# Patient Record
Sex: MALE | Race: WHITE | NOT HISPANIC OR LATINO | Employment: OTHER | URBAN - METROPOLITAN AREA
[De-identification: names, ages, dates, MRNs, and addresses within clinical notes are randomized per-mention and may not be internally consistent; named-entity substitution may affect disease eponyms.]

---

## 2017-04-18 ENCOUNTER — TRANSFERRED RECORDS (OUTPATIENT)
Dept: HEALTH INFORMATION MANAGEMENT | Facility: CLINIC | Age: 62
End: 2017-04-18

## 2017-04-23 DIAGNOSIS — E78.5 HYPERLIPIDEMIA LDL GOAL <130: ICD-10-CM

## 2017-04-23 NOTE — LETTER
[facility]  [address] [address]  [phone]      May 2, 2017      Pancho Costello  [address] [address] [address]        Dear Pancho,      Your atorvastatin (LIPITOR) 20 MG tablet was refilled for 30 days   You are due to be seen in clinic by your Provider prior to your next refill   Please contact the clinic to schedule a fasting office visit and allow enough time to schedule prior to your next refill need.  [phone]       Marta  for :   Dr. Yanira Corea, NPCNP   Dr. Suzie Howe

## 2017-04-25 RX ORDER — ATORVASTATIN CALCIUM 20 MG/1
TABLET, FILM COATED ORAL
Qty: 30 TABLET | Refills: 0 | Status: SHIPPED | OUTPATIENT
Start: 2017-04-25 | End: 2017-07-14

## 2017-04-25 NOTE — TELEPHONE ENCOUNTER
please call patient to schedule fasting office visit for medication        Refill approved through [facility] protocol.  Blanca Nick RN  [facility]  [phone]

## 2017-04-25 NOTE — TELEPHONE ENCOUNTER
Lipitor     Last Written Prescription Date: 5/3/16  Last Fill Quantity: 30, # refills: 11  Last Office Visit with [facility], [facility] or [facility] prescribing provider: 11/2/16       Lab Results   Component Value Date    CHOL 175 05/03/2016     Lab Results   Component Value Date    HDL 46 05/03/2016     Lab Results   Component Value Date     05/03/2016     Lab Results   Component Value Date    TRIG 105 05/03/2016     Lab Results   Component Value Date    CHOLHDLRATIO 4.2 07/13/2015     HARDEEP Asif LPN

## 2017-05-01 ENCOUNTER — EXTERNAL ORDER RESULTS (OUTPATIENT)
Dept: HEALTH INFORMATION MANAGEMENT | Facility: CLINIC | Age: 62
End: 2017-05-01

## 2017-05-01 DIAGNOSIS — E11.9 TYPE 2 DIABETES MELLITUS WITHOUT COMPLICATION, WITHOUT LONG-TERM CURRENT USE OF INSULIN (H): ICD-10-CM

## 2017-05-01 NOTE — TELEPHONE ENCOUNTER
Metformin         Last Written Prescription Date: 11/2/16  Last Fill Quantity: 180, # refills: 1  Last Office Visit with [facility], [facility] or [facility] prescribing provider:  11/2/16        BP Readings from Last 3 Encounters:   11/02/16 136/84   06/14/16 130/86   12/08/15 138/84     Lab Results   Component Value Date    MICROL 6 06/14/2016     Lab Results   Component Value Date    UMALCR 4.31 06/14/2016     Creatinine   Date Value Ref Range Status   11/02/2016 0.81 0.66 - 1.25 mg/dL Final   ]  GFR Estimate   Date Value Ref Range Status   11/02/2016 >90  Non  GFR Calc   >60 mL/min/1.7m2 Final   06/11/2015 >90  Non  GFR Calc   >60 mL/min/1.7m2 Final     GFR Estimate If Black   Date Value Ref Range Status   11/02/2016 >90   GFR Calc   >60 mL/min/1.7m2 Final   06/11/2015 >90   GFR Calc   >60 mL/min/1.7m2 Final     Lab Results   Component Value Date    CHOL 175 05/03/2016     Lab Results   Component Value Date    HDL 46 05/03/2016     Lab Results   Component Value Date     05/03/2016     Lab Results   Component Value Date    TRIG 105 05/03/2016     Lab Results   Component Value Date    CHOLHDLRATIO 4.2 07/13/2015     Lab Results   Component Value Date    AST 57 11/02/2016     Lab Results   Component Value Date     11/02/2016     Lab Results   Component Value Date    A1C 7.4 11/02/2016    A1C 6.8 06/14/2016    A1C 6.3 12/08/2015    A1C 8.3 07/13/2015    A1C 10.6 06/11/2015     Potassium   Date Value Ref Range Status   11/02/2016 4.4 3.4 - 5.3 mmol/L Final     Gina Cervantes CMA

## 2017-05-01 NOTE — LETTER
[facility]  [address] [place] [address]  [phone]      May 11, 2017      Pancho Costello  [address] [address] [address]        Dear Pancho,    Our records indicates that it is time for you to be seen for an office visit with Chris Zuluaga MD.    Please call our office at [phone] to schedule an appointment for Medication Check .   If you are no longer a [facility] patient; please let us know that as well.    Please disregard this notice if you have already made an appointment.      Sincerely,    [facility] Staff

## 2017-05-02 NOTE — PATIENT INSTRUCTIONS
metformin         Last Written Prescription Date: 11/2/16  Last Fill Quantity: 180, # refills: 1  Last Office Visit with [facility], [facility] or [facility] prescribing provider:  11/2/16        BP Readings from Last 3 Encounters:   11/02/16 136/84   06/14/16 130/86   12/08/15 138/84     Lab Results   Component Value Date    MICROL 6 06/14/2016     Lab Results   Component Value Date    UMALCR 4.31 06/14/2016     Creatinine   Date Value Ref Range Status   11/02/2016 0.81 0.66 - 1.25 mg/dL Final   ]  GFR Estimate   Date Value Ref Range Status   11/02/2016 >90  Non  GFR Calc   >60 mL/min/1.7m2 Final   06/11/2015 >90  Non  GFR Calc   >60 mL/min/1.7m2 Final     GFR Estimate If Black   Date Value Ref Range Status   11/02/2016 >90   GFR Calc   >60 mL/min/1.7m2 Final   06/11/2015 >90   GFR Calc   >60 mL/min/1.7m2 Final     Lab Results   Component Value Date    CHOL 175 05/03/2016     Lab Results   Component Value Date    HDL 46 05/03/2016     Lab Results   Component Value Date     05/03/2016     Lab Results   Component Value Date    TRIG 105 05/03/2016     Lab Results   Component Value Date    CHOLHDLRATIO 4.2 07/13/2015     Lab Results   Component Value Date    AST 57 11/02/2016     Lab Results   Component Value Date     11/02/2016     Lab Results   Component Value Date    A1C 7.4 11/02/2016    A1C 6.8 06/14/2016    A1C 6.3 12/08/2015    A1C 8.3 07/13/2015    A1C 10.6 06/11/2015     Potassium   Date Value Ref Range Status   11/02/2016 4.4 3.4 - 5.3 mmol/L Final     Routing refill request to provider for review/approval because:  Labs not current:  Due every 6 months for protocol    Blanca Nick RN  [facility]  [phone]

## 2017-05-02 NOTE — TELEPHONE ENCOUNTER
Routing to team to inform and assist in scheduling.   Ria Blackmon RN   [facility] - Triage

## 2017-07-14 ENCOUNTER — OFFICE VISIT (OUTPATIENT)
Dept: FAMILY MEDICINE | Facility: CLINIC | Age: 62
End: 2017-07-14
Payer: COMMERCIAL

## 2017-07-14 VITALS
TEMPERATURE: 98.6 F | HEART RATE: 78 BPM | HEIGHT: 72 IN | OXYGEN SATURATION: 95 % | BODY MASS INDEX: 35.35 KG/M2 | SYSTOLIC BLOOD PRESSURE: 138 MMHG | DIASTOLIC BLOOD PRESSURE: 82 MMHG | WEIGHT: 261 LBS

## 2017-07-14 DIAGNOSIS — E11.9 TYPE 2 DIABETES MELLITUS WITHOUT COMPLICATION, WITHOUT LONG-TERM CURRENT USE OF INSULIN (H): ICD-10-CM

## 2017-07-14 DIAGNOSIS — I10 HYPERTENSION GOAL BP (BLOOD PRESSURE) < 140/80: ICD-10-CM

## 2017-07-14 DIAGNOSIS — E66.01 MORBID OBESITY, UNSPECIFIED OBESITY TYPE (H): ICD-10-CM

## 2017-07-14 DIAGNOSIS — E78.5 HYPERLIPIDEMIA LDL GOAL <100: Primary | ICD-10-CM

## 2017-07-14 LAB — HBA1C MFR BLD: 6.4 % (ref 4.3–6)

## 2017-07-14 PROCEDURE — 36415 COLL VENOUS BLD VENIPUNCTURE: CPT | Performed by: FAMILY MEDICINE

## 2017-07-14 PROCEDURE — 80053 COMPREHEN METABOLIC PANEL: CPT | Performed by: FAMILY MEDICINE

## 2017-07-14 PROCEDURE — 99214 OFFICE O/P EST MOD 30 MIN: CPT | Performed by: FAMILY MEDICINE

## 2017-07-14 PROCEDURE — 83036 HEMOGLOBIN GLYCOSYLATED A1C: CPT | Performed by: FAMILY MEDICINE

## 2017-07-14 PROCEDURE — 82043 UR ALBUMIN QUANTITATIVE: CPT | Performed by: FAMILY MEDICINE

## 2017-07-14 PROCEDURE — G0103 PSA SCREENING: HCPCS | Performed by: FAMILY MEDICINE

## 2017-07-14 PROCEDURE — 80061 LIPID PANEL: CPT | Performed by: FAMILY MEDICINE

## 2017-07-14 RX ORDER — LISINOPRIL 20 MG/1
20 TABLET ORAL DAILY
Qty: 90 TABLET | Refills: 3 | Status: CANCELLED | OUTPATIENT
Start: 2017-07-14

## 2017-07-14 RX ORDER — ATORVASTATIN CALCIUM 20 MG/1
TABLET, FILM COATED ORAL
Qty: 90 TABLET | Refills: 1 | Status: SHIPPED | OUTPATIENT
Start: 2017-07-14 | End: 2018-01-08

## 2017-07-14 RX ORDER — ATORVASTATIN CALCIUM 20 MG/1
TABLET, FILM COATED ORAL
Qty: 30 TABLET | Refills: 0 | Status: CANCELLED | OUTPATIENT
Start: 2017-07-14

## 2017-07-14 RX ORDER — LANCETS
EACH MISCELLANEOUS
Qty: 100 EACH | Refills: 1 | Status: SHIPPED | OUTPATIENT
Start: 2017-07-14 | End: 2019-02-13

## 2017-07-14 NOTE — PROGRESS NOTES
SUBJECTIVE:                                                    Pancho Costello is a 61 year old male who presents to clinic today for the following health issues:      Diabetes Follow-up  His dose was increase to 1000 mg metformin BID.  He denies nay side effects. He continue to take his lisinopril and cholesterol medication. Denies any chest pain, shortness of breath.      Patient is checking blood sugars:  2 times a week 120. 110. 130     Diabetic concerns: None     Symptoms of hypoglycemia (low blood sugar): blurred vision     Paresthesias (numbness or burning in feet) or sores: Yes little      Date of last diabetic eye exam: 2 months ago       Amount of exercise or physical activity: 4 days a week     Problems taking medications regularly: No    Medication side effects: stomach ache     Diet: low fat/cholesterol          Problem list and histories reviewed & adjusted, as indicated.  Additional history: as documented    Patient Active Problem List   Diagnosis     Hypertension goal BP (blood pressure) < 140/80     Hyperlipidemia LDL goal <100     Advanced directives, counseling/discussion     Type 2 diabetes mellitus without complication, without long-term current use of insulin (H)     Morbid obesity, unspecified obesity type (H)     Past Surgical History:   Procedure Laterality Date     LIGATN/STRIP LONG & SHORT SAPHEN      2000       Social History   Substance Use Topics     Smoking status: Never Smoker     Smokeless tobacco: Never Used     Alcohol use 0.0 oz/week     0 Standard drinks or equivalent per week     Family History   Problem Relation Age of Onset     Heart Failure Mother      Breast Cancer Mother      DIABETES Mother          Current Outpatient Prescriptions   Medication Sig Dispense Refill     blood glucose monitoring (ACCU-CHEK SMARTVIEW) test strip Testing 1-2 times per week 100 each 1     blood glucose monitoring (ACCU-CHEK FASTCLIX) lancets Test 1 times a day 100 each 1     metFORMIN  (GLUCOPHAGE) 1000 MG tablet TAKE 1 TABLET(1000 MG) BY MOUTH TWICE DAILY WITH MEALS 60 tablet 0     atorvastatin (LIPITOR) 20 MG tablet TAKE 1 TABLET(20 MG) BY MOUTH DAILY 30 tablet 0     lisinopril (PRINIVIL,ZESTRIL) 20 MG tablet Take 1 tablet (20 mg) by mouth daily 90 tablet 3     aspirin 81 MG tablet Take 1 tablet (81 mg) by mouth daily 30 tablet 11       Reviewed and updated as needed this visit by clinical staff  Tobacco  Allergies  Meds       Reviewed and updated as needed this visit by Provider         ROS:  C: NEGATIVE for fever, chills, change in weight  E/M: NEGATIVE for ear, mouth and throat problems  R: NEGATIVE for significant cough or SOB  CV: NEGATIVE for chest pain, palpitations or peripheral edema    OBJECTIVE:                                                    /82  Pulse 78  Temp 98.6  F (37  C) (Oral)  Ht 6' (1.829 m)  Wt 261 lb (118.4 kg)  SpO2 95%  BMI 35.4 kg/m2  Body mass index is 35.4 kg/(m^2).   GENERAL: healthy, alert, well nourished, well hydrated, no distress  HENT: ear canals- normal; TMs- normal; Nose- normal; Mouth- no ulcers, no lesions  NECK: no tenderness, no adenopathy, no asymmetry, no masses, no stiffness; thyroid- normal to palpation  RESP: lungs clear to auscultation - no rales, no rhonchi, no wheezes  CV: regular rates and rhythm, normal S1 S2, no S3 or S4 and no murmur, no click or rub -  ABDOMEN: soft, no tenderness, no  hepatosplenomegaly, no masses, normal bowel sounds       ASSESSMENT/PLAN:                                                        ICD-10-CM    1. Hyperlipidemia LDL goal <100 E78.5 Comprehensive metabolic panel     Lipid Profile (Chol, Trig, HDL, LDL calc)   2. Type 2 diabetes mellitus without complication, without long-term current use of insulin (H) E11.9 HEMOGLOBIN A1C     blood glucose monitoring (ACCU-CHEK SMARTVIEW) test strip     blood glucose monitoring (ACCU-CHEK FASTCLIX) lancets     Comprehensive metabolic panel     Lipid Profile  (Chol, Trig, HDL, LDL calc)     Albumin Random Urine Quantitative   3. Hypertension goal BP (blood pressure) < 140/80 I10 Comprehensive metabolic panel     PSA, screen     Albumin Random Urine Quantitative   4. Morbid obesity, unspecified obesity type (H) E66.01        Patient Blood pressure is better recked was better, AIC is improved now with number now in normal range, he is working on the life style.  Labs ordered will follow up on them.  No new medication change, will follow up on the kidney function. He will follow up in dec-2017 will reevaluate the need of adjusting the latham, no new or change in medications.    Chris Zuluaga MD  [facility]

## 2017-07-14 NOTE — Clinical Note
Please abstract the following data from this visit with this patient into the appropriate field in Epic:  Eye exam with ophthalmology on this date: may 2017

## 2017-07-14 NOTE — MR AVS SNAPSHOT
After Visit Summary   7/14/2017    Pancho Costello    MRN: [id number]           Patient Information     Date Of Birth          1955        Visit Information        Provider Department      7/14/2017 8:40 AM [facility]        Today's Diagnoses     Hyperlipidemia LDL goal <100    -  1    Type 2 diabetes mellitus without complication, without long-term current use of insulin (H)        Hypertension goal BP (blood pressure) < 140/80        Morbid obesity, unspecified obesity type (H)           Follow-ups after your visit        Who to contact     If you have questions or need follow up information about today's clinic visit or your schedule please contact [facility] directly at [phone].  Normal or non-critical lab and imaging results will be communicated to you by Sparks[employer], letter or phone within 4 business days after the clinic has received the results. If you do not hear from us within 7 days, please contact the clinic through NPSt or phone. If you have a critical or abnormal lab result, we will notify you by phone as soon as possible.  Submit refill requests through [employer] or call your pharmacy and they will forward the refill request to us. Please allow 3 business days for your refill to be completed.          Additional Information About Your Visit        MyChart Information     [employer] gives you secure access to your electronic health record. If you see a primary care provider, you can also send messages to your care team and make appointments. If you have questions, please call your primary care clinic.  If you do not have a primary care provider, please call [phone] and they will assist you.        Care EveryWhere ID     This is your Care EveryWhere ID. This could be used by other organizations to access your [facility] medical records  [id number]        Your Vitals Were     Pulse Temperature Height Pulse Oximetry BMI (Body Mass  Index)       78 98.6  F (37  C) (Oral) 6' (1.829 m) 95% 35.4 kg/m2        Blood Pressure from Last 3 Encounters:   07/14/17 138/82   11/02/16 136/84   06/14/16 130/86    Weight from Last 3 Encounters:   07/14/17 261 lb (118.4 kg)   11/07/16 264 lb (119.7 kg)   11/02/16 258 lb 6.4 oz (117.2 kg)              We Performed the Following     Albumin Random Urine Quantitative     Comprehensive metabolic panel     HEMOGLOBIN A1C     Lipid Profile (Chol, Trig, HDL, LDL calc)     PSA, screen          Today's Medication Changes          These changes are accurate as of: 7/14/17  9:34 AM.  If you have any questions, ask your nurse or doctor.               These medicines have changed or have updated prescriptions.        Dose/Directions    atorvastatin 20 MG tablet   Commonly known as:  LIPITOR   This may have changed:  See the new instructions.   Used for:  Hyperlipidemia LDL goal <100   Changed by:  Chris Zuluaga MD        TAKE 1 TABLET(20 MG) BY MOUTH DAILY   Quantity:  90 tablet   Refills:  1       blood glucose monitoring lancets   This may have changed:  additional instructions   Used for:  Type 2 diabetes mellitus without complication, without long-term current use of insulin (H)   Changed by:  Chris Zuluaga MD        Test 1 times a day   Quantity:  100 each   Refills:  1       metFORMIN 1000 MG tablet   Commonly known as:  GLUCOPHAGE   This may have changed:  See the new instructions.   Used for:  Type 2 diabetes mellitus without complication, without long-term current use of insulin (H)   Changed by:  Chris Zuluaga MD        TAKE 1 TABLET(1000 MG) BY MOUTH TWICE DAILY WITH MEALS   Quantity:  180 tablet   Refills:  1            Where to get your medicines      These medications were sent to [id number] - [place], [place] - [address] [place] AT [facility] [address]  [address] [address] [place], [place] [address]     Phone:  [phone]     atorvastatin 20 MG tablet    blood glucose  monitoring lancets    blood glucose monitoring test strip    metFORMIN 1000 MG tablet                Primary Care Provider Office Phone # Fax #    Chris Zuluaga [phone]       [facility] [place] [address]  [place] [address]        Equal Access to Services     PURVI ALVA : Hadii aad ku hadasho Soomaali, waaxda luqadaha, qaybta kaalmada adeegyada, waxay idiin hayaan adeeg kharash la'aan ah. So [facility] [phone].    ATENCIÓN: Si habla español, tiene a ruvalcaba disposición servicios gratuitos de asistencia lingüística. Llame al [phone].    We comply with applicable federal civil rights laws and [place] laws. We do not discriminate on the basis of race, color, national origin, age, disability sex, sexual orientation or gender identity.            Thank you!     Thank you for choosing [facility] [place]  for your care. Our goal is always to provide you with excellent care. Hearing back from our patients is one way we can continue to improve our services. Please take a few minutes to complete the written survey that you may receive in the mail after your visit with us. Thank you!             Your Updated Medication List - Protect others around you: Learn how to safely use, store and throw away your medicines at www.disposemymeds.org.          This list is accurate as of: 7/14/17  9:34 AM.  Always use your most recent med list.                   Brand Name Dispense Instructions for use Diagnosis    aspirin 81 MG tablet     30 tablet    Take 1 tablet (81 mg) by mouth daily    Type 2 diabetes, HbA1C goal < 8% (H)       atorvastatin 20 MG tablet    LIPITOR    90 tablet    TAKE 1 TABLET(20 MG) BY MOUTH DAILY    Hyperlipidemia LDL goal <100       blood glucose monitoring lancets     100 each    Test 1 times a day    Type 2 diabetes mellitus without complication, without long-term current use of insulin (H)       blood glucose monitoring test strip    ACCU-CHEK SMARTVIEW     100 each    Testing 1-2 times per week    Type 2 diabetes mellitus without complication, without long-term current use of insulin (H)       lisinopril 20 MG tablet    PRINIVIL/ZESTRIL    90 tablet    Take 1 tablet (20 mg) by mouth daily    Hypertension goal BP (blood pressure) < 140/80       metFORMIN 1000 MG tablet    GLUCOPHAGE    180 tablet    TAKE 1 TABLET(1000 MG) BY MOUTH TWICE DAILY WITH MEALS    Type 2 diabetes mellitus without complication, without long-term current use of insulin (H)

## 2017-07-15 LAB
ALBUMIN SERPL-MCNC: 4.1 G/DL (ref 3.4–5)
ALP SERPL-CCNC: 74 U/L (ref 40–150)
ALT SERPL W P-5'-P-CCNC: 95 U/L (ref 0–70)
ANION GAP SERPL CALCULATED.3IONS-SCNC: 8 MMOL/L (ref 3–14)
AST SERPL W P-5'-P-CCNC: 48 U/L (ref 0–45)
BILIRUB SERPL-MCNC: 0.8 MG/DL (ref 0.2–1.3)
BUN SERPL-MCNC: 15 MG/DL (ref 7–30)
CALCIUM SERPL-MCNC: 9.7 MG/DL (ref 8.5–10.1)
CHLORIDE SERPL-SCNC: 101 MMOL/L (ref 94–109)
CHOLEST SERPL-MCNC: 217 MG/DL
CO2 SERPL-SCNC: 28 MMOL/L (ref 20–32)
CREAT SERPL-MCNC: 0.78 MG/DL (ref 0.66–1.25)
CREAT UR-MCNC: 213 MG/DL
GFR SERPL CREATININE-BSD FRML MDRD: ABNORMAL ML/MIN/1.7M2
GLUCOSE SERPL-MCNC: 152 MG/DL (ref 70–99)
HDLC SERPL-MCNC: 49 MG/DL
LDLC SERPL CALC-MCNC: 148 MG/DL
MICROALBUMIN UR-MCNC: 16 MG/L
MICROALBUMIN/CREAT UR: 7.7 MG/G CR (ref 0–17)
NONHDLC SERPL-MCNC: 168 MG/DL
POTASSIUM SERPL-SCNC: 4.2 MMOL/L (ref 3.4–5.3)
PROT SERPL-MCNC: 8 G/DL (ref 6.8–8.8)
PSA SERPL-ACNC: 0.25 UG/L (ref 0–4)
SODIUM SERPL-SCNC: 137 MMOL/L (ref 133–144)
TRIGL SERPL-MCNC: 100 MG/DL

## 2017-10-24 DIAGNOSIS — I10 HYPERTENSION GOAL BP (BLOOD PRESSURE) < 140/80: ICD-10-CM

## 2017-10-24 RX ORDER — LISINOPRIL 20 MG/1
TABLET ORAL
Qty: 90 TABLET | Refills: 2 | Status: SHIPPED | OUTPATIENT
Start: 2017-10-24 | End: 2018-06-13

## 2017-10-24 NOTE — TELEPHONE ENCOUNTER
Prescription approved per [facility], [facility] or [facility] refill protocol.  Ewa Roth RN - Triage  [facility]

## 2018-05-17 DIAGNOSIS — E11.9 TYPE 2 DIABETES MELLITUS WITHOUT COMPLICATION, WITHOUT LONG-TERM CURRENT USE OF INSULIN (H): ICD-10-CM

## 2018-05-17 DIAGNOSIS — E78.5 HYPERLIPIDEMIA LDL GOAL <100: ICD-10-CM

## 2018-05-17 RX ORDER — ATORVASTATIN CALCIUM 20 MG/1
TABLET, FILM COATED ORAL
Qty: 30 TABLET | Refills: 0 | OUTPATIENT
Start: 2018-05-17

## 2018-05-17 NOTE — TELEPHONE ENCOUNTER
Requested Prescriptions   Pending Prescriptions Disp Refills     metFORMIN (GLUCOPHAGE) 1000 MG tablet [Pharmacy Med Name: METFORMIN 1000MG TABLETS] 20 tablet 0    Last Written Prescription Date:  04/12/2018  Last Fill Quantity: 20 tablet,  # refills: 0   Last office visit: 7/14/2017 with prescribing provider:  Chris Zuluaga   Future Office Visit:   Next 5 appointments (look out 90 days)     May 23, 2018  9:00 AM CDT   MyChart Physical Adult with Chris Zuluaga MD   [facility] [place] [address]   [phone]                  Sig: TAKE 1 TABLET BY MOUTH TWICE DAILY WITH MEALS    Biguanide Agents Failed    5/17/2018 10:28 AM       Failed - Blood pressure less than 140/90 in past 6 months    BP Readings from Last 3 Encounters:   07/14/17 138/82   11/02/16 136/84   06/14/16 130/86                Failed - Patient has documented A1c within the specified period of time.    If HgbA1C is 8 or greater, it needs to be on file within the past 3 months.  If less than 8, must be on file within the past 6 months.     Recent Labs   Lab Test  07/14/17   0903   A1C  6.4*            Passed - Patient has documented LDL within the past 12 mos.    Recent Labs   Lab Test  07/14/17   0903   LDL  148*            Passed - Patient has had a Microalbumin in the past 12 mos.    Recent Labs   Lab Test  07/14/17   0908   MICROL  16   UMALCR  7.70            Passed - Patient is age 10 or older       Passed - Patient's CR is NOT>1.4 OR Patient's EGFR is NOT<45 within past 12 mos.    Recent Labs   Lab Test  07/14/17   0903   GFRESTIMATED  >90  Non  GFR Calc     GFRESTBLACK  >90   GFR Calc         Recent Labs   Lab Test  07/14/17   0903   CR  0.78            Passed - Patient does NOT have a diagnosis of CHF.       Passed - Recent (6 mo) or future (30 days) visit within the authorizing provider's specialty    Patient had office visit in  "the last 6 months or has a visit in the next 30 days with authorizing provider or within the authorizing provider's specialty.  See \"Patient Info\" tab in inbasket, or \"Choose Columns\" in Meds & Orders section of the refill encounter.            atorvastatin (LIPITOR) 20 MG tablet [Pharmacy Med Name: ATORVASTATIN 20MG TABLETS] 30 tablet 0    Last Written Prescription Date:  03/14/2018  Last Fill Quantity: 30 tablet,  # refills: 1   Last office visit: 7/14/2017 with prescribing provider:  Chris Zuluaga   Future Office Visit:   Next 5 appointments (look out 90 days)     May 23, 2018  9:00 AM CDT   MyChart Physical Adult with Chris Zuluaga MD   [facility] [place] [address]   [phone]                  Sig: TAKE ONE TABLET BY MOUTH DAILY    Statins Protocol Passed    5/17/2018 10:28 AM       Passed - LDL on file in past 12 months    Recent Labs   Lab Test  07/14/17   0903   LDL  148*            Passed - No abnormal creatine kinase in past 12 months    No lab results found.            Passed - Recent (12 mo) or future (30 days) visit within the authorizing provider's specialty    Patient had office visit in the last 12 months or has a visit in the next 30 days with authorizing provider or within the authorizing provider's specialty.  See \"Patient Info\" tab in inbasket, or \"Choose Columns\" in Meds & Orders section of the refill encounter.           Passed - Patient is age 18 or older          "

## 2018-05-17 NOTE — TELEPHONE ENCOUNTER
Patient has been given multiple luna refills. See previous notes. No further refills until seen. Routing to team to inform and assist in scheduling.   Ria Blackmon RN   [facility] - Triage

## 2018-05-21 RX ORDER — ATORVASTATIN CALCIUM 20 MG/1
20 TABLET, FILM COATED ORAL DAILY
Qty: 30 TABLET | Refills: 1 | Status: SHIPPED | OUTPATIENT
Start: 2018-05-21 | End: 2018-06-14

## 2018-05-21 NOTE — TELEPHONE ENCOUNTER
RN's have give a 1 time luna refill per protocol routing to providers for further refills until scheduled appointment.  Ewa Roth RN - Triage  [facility]      Next 5 appointments (look out 90 days)     Jun 13, 2018  8:20 AM CDT   Office Visit with Chris Zuluaga MD   [facility]    [address] [place] [id number] [phone]

## 2018-06-13 ENCOUNTER — OFFICE VISIT (OUTPATIENT)
Dept: FAMILY MEDICINE | Facility: CLINIC | Age: 63
End: 2018-06-13
Payer: COMMERCIAL

## 2018-06-13 VITALS
HEART RATE: 68 BPM | DIASTOLIC BLOOD PRESSURE: 80 MMHG | SYSTOLIC BLOOD PRESSURE: 124 MMHG | TEMPERATURE: 97.9 F | BODY MASS INDEX: 35.26 KG/M2 | WEIGHT: 260 LBS

## 2018-06-13 DIAGNOSIS — E78.5 HYPERLIPIDEMIA LDL GOAL <100: ICD-10-CM

## 2018-06-13 DIAGNOSIS — E66.01 MORBID OBESITY, UNSPECIFIED OBESITY TYPE (H): ICD-10-CM

## 2018-06-13 DIAGNOSIS — I10 HYPERTENSION GOAL BP (BLOOD PRESSURE) < 140/80: ICD-10-CM

## 2018-06-13 DIAGNOSIS — E11.9 TYPE 2 DIABETES MELLITUS WITHOUT COMPLICATION, WITHOUT LONG-TERM CURRENT USE OF INSULIN (H): Primary | ICD-10-CM

## 2018-06-13 DIAGNOSIS — Z11.4 SCREENING FOR HIV (HUMAN IMMUNODEFICIENCY VIRUS): ICD-10-CM

## 2018-06-13 DIAGNOSIS — Z13.89 SCREENING FOR DIABETIC PERIPHERAL NEUROPATHY: ICD-10-CM

## 2018-06-13 LAB
HBA1C MFR BLD: 7.2 % (ref 0–5.6)
HIV 1+2 AB+HIV1 P24 AG SERPL QL IA: NONREACTIVE

## 2018-06-13 PROCEDURE — 82043 UR ALBUMIN QUANTITATIVE: CPT | Performed by: FAMILY MEDICINE

## 2018-06-13 PROCEDURE — 36415 COLL VENOUS BLD VENIPUNCTURE: CPT | Performed by: FAMILY MEDICINE

## 2018-06-13 PROCEDURE — 99214 OFFICE O/P EST MOD 30 MIN: CPT | Performed by: FAMILY MEDICINE

## 2018-06-13 PROCEDURE — 83036 HEMOGLOBIN GLYCOSYLATED A1C: CPT | Performed by: FAMILY MEDICINE

## 2018-06-13 PROCEDURE — 80061 LIPID PANEL: CPT | Performed by: FAMILY MEDICINE

## 2018-06-13 PROCEDURE — 87389 HIV-1 AG W/HIV-1&-2 AB AG IA: CPT | Performed by: FAMILY MEDICINE

## 2018-06-13 PROCEDURE — 84443 ASSAY THYROID STIM HORMONE: CPT | Performed by: FAMILY MEDICINE

## 2018-06-13 PROCEDURE — 80053 COMPREHEN METABOLIC PANEL: CPT | Performed by: FAMILY MEDICINE

## 2018-06-13 PROCEDURE — 99207 C FOOT EXAM  NO CHARGE: CPT | Performed by: FAMILY MEDICINE

## 2018-06-13 RX ORDER — LISINOPRIL 20 MG/1
TABLET ORAL
Qty: 90 TABLET | Refills: 3 | Status: SHIPPED | OUTPATIENT
Start: 2018-06-13 | End: 2019-07-02

## 2018-06-13 NOTE — MR AVS SNAPSHOT
After Visit Summary   6/13/2018    Pancho Costello    MRN: [id number]           Patient Information     Date Of Birth          1955        Visit Information        Provider Department      6/13/2018 8:20 AM Chris Zuluaga MD [facility] [place]        Today's Diagnoses     Type 2 diabetes mellitus without complication, without long-term current use of insulin (H)    -  1    Screening for HIV (human immunodeficiency virus)        Morbid obesity, unspecified obesity type (H)        Screening for diabetic peripheral neuropathy        Hyperlipidemia LDL goal <100        Hypertension goal BP (blood pressure) < 140/80           Follow-ups after your visit        Follow-up notes from your care team     Return in about 3 months (around 9/13/2018) for Physical Exam.      Your next 10 appointments already scheduled     Sep 11, 2018  8:20 AM CDT   Office Visit with Chris Zuluaga MD   [facility] [place] ([facility] [place])    [address] [place] [address]   [phone]           Bring a current list of meds and any records pertaining to this visit. For Physicals, please bring immunization records and any forms needing to be filled out. Please arrive 10 minutes early to complete paperwork.              Who to contact     If you have questions or need follow up information about today's clinic visit or your schedule please contact [facility] [place] directly at [phone].  Normal or non-critical lab and imaging results will be communicated to you by MyChart, letter or phone within 4 business days after the clinic has received the results. If you do not hear from us within 7 days, please contact the clinic through MyChart or phone. If you have a critical or abnormal lab result, we will notify you by phone as soon as possible.  Submit refill requests through [employer] or call your pharmacy and they will forward the refill request to us. Please allow 3  business days for your refill to be completed.          Additional Information About Your Visit        MyChart Information     [employer] gives you secure access to your electronic health record. If you see a primary care provider, you can also send messages to your care team and make appointments. If you have questions, please call your primary care clinic.  If you do not have a primary care provider, please call [phone] and they will assist you.        Care EveryWhere ID     This is your Care EveryWhere ID. This could be used by other organizations to access your [place] medical records  [id number]        Your Vitals Were     Pulse Temperature BMI (Body Mass Index)             68 97.9  F (36.6  C) (Tympanic) 35.26 kg/m2          Blood Pressure from Last 3 Encounters:   06/13/18 124/80   07/14/17 138/82   11/02/16 136/84    Weight from Last 3 Encounters:   06/13/18 260 lb (117.9 kg)   07/14/17 261 lb (118.4 kg)   11/07/16 264 lb (119.7 kg)              We Performed the Following     Albumin Random Urine Quantitative with Creat Ratio     Comprehensive metabolic panel     FOOT EXAM  NO CHARGE [36621.003]     HEMOGLOBIN A1C     HIV Screening     Lipid panel reflex to direct LDL Fasting     TSH WITH FREE T4 REFLEX        Primary Care Provider Office Phone # Fax #    Chris Zuluaga [phone]       [address]  [place] [address]        Equal Access to Services     [facility]: Hadii karrie ross Som[employer], waaxda luqadaha, qaybta kaalmada marce, isabel link . So [facility] [phone].    ATENCIÓN: Si habla español, tiene a ruvalcaba disposición servicios gratuitos de asistencia lingüística. Llame al [phone].    We comply with applicable federal civil rights laws and [place] laws. We do not discriminate on the basis of race, color, national origin, age, disability, sex, sexual orientation, or gender identity.            Thank you!     Thank you for  choosing [facility] [place]  for your care. Our goal is always to provide you with excellent care. Hearing back from our patients is one way we can continue to improve our services. Please take a few minutes to complete the written survey that you may receive in the mail after your visit with us. Thank you!             Your Updated Medication List - Protect others around you: Learn how to safely use, store and throw away your medicines at www.disposemymeds.org.          This list is accurate as of 6/13/18  9:08 AM.  Always use your most recent med list.                   Brand Name Dispense Instructions for use Diagnosis    aspirin 81 MG tablet     30 tablet    Take 1 tablet (81 mg) by mouth daily    Type 2 diabetes, HbA1C goal < 8% (H)       atorvastatin 20 MG tablet    LIPITOR    30 tablet    Take 1 tablet (20 mg) by mouth daily    Hyperlipidemia LDL goal <100       blood glucose monitoring lancets     100 each    Test 1 times a day    Type 2 diabetes mellitus without complication, without long-term current use of insulin (H)       blood glucose monitoring test strip    ACCU-CHEK SMARTVIEW    100 each    Testing 1-2 times per week    Type 2 diabetes mellitus without complication, without long-term current use of insulin (H)       lisinopril 20 MG tablet    PRINIVIL/ZESTRIL    90 tablet    TAKE 1 TABLET(20 MG) BY MOUTH DAILY    Hypertension goal BP (blood pressure) < 140/80       metFORMIN 1000 MG tablet    GLUCOPHAGE    60 tablet    TAKE ONE TABLET BY MOUTH TWICE DAILY WITH MEALS    Type 2 diabetes mellitus without complication, without long-term current use of insulin (H)

## 2018-06-13 NOTE — PROGRESS NOTES
SUBJECTIVE:   Pancho Costello is a 62 year old male who presents to clinic today for the following health issues:      Diabetes Follow-up    Patient is currently taking metformin thousand milligrams twice daily.  Along with that losartan and Lipitor.      Patient is checking blood sugars: more recently with changes in diet -     Diabetic concerns: None     Symptoms of hypoglycemia (low blood sugar): none     Paresthesias (numbness or burning in feet) or sores: Yes right big toe     Date of last diabetic eye exam: 5/2017    BP Readings from Last 2 Encounters:   06/13/18 124/80   07/14/17 138/82     Hemoglobin A1C (%)   Date Value   07/14/2017 6.4 (H)   11/02/2016 7.4 (H)     LDL Cholesterol Calculated (mg/dL)   Date Value   07/14/2017 148 (H)   05/03/2016 108 (H)       Amount of exercise or physical activity: 4 times per week    Problems taking medications regularly: No    Medication side effects: none    Diet: low carb    Blood pressure follow-up.:  She has been checking his blood pressure 1-2 times a week.  It has been in the normal range.  He denies any side effect of the medication.    Cholesterol follow up:    Patient is currently taking Lipitor 20 mg.  He denies any side effects.  His liver functions are mildly elevated in the past.  He denies drinking excessive alcohol currently.  In the past he has some issues with increased alcohol intake.  Denies any other symptoms.        Problem list and histories reviewed & adjusted, as indicated.  Additional history: as documented        Reviewed and updated as needed this visit by clinical staff  Tobacco  Allergies  Meds       Reviewed and updated as needed this visit by Provider         ROS:  CONSTITUTIONAL: NEGATIVE for fever, chills, change in weight  ENT/MOUTH: NEGATIVE for ear, mouth and throat problems  RESP: NEGATIVE for significant cough or SOB  CV: NEGATIVE for chest pain, palpitations or peripheral edema    OBJECTIVE:                                                     /80  Pulse 68  Temp 97.9  F (36.6  C) (Tympanic)  Wt 260 lb (117.9 kg)  BMI 35.26 kg/m2  Body mass index is 35.26 kg/(m^2).   GENERAL: healthy, alert, well nourished, well hydrated, no distress  HENT: ear canals- normal; TMs- normal; Nose- normal; Mouth- no ulcers, no lesions  NECK: no tenderness, no adenopathy, no asymmetry, no masses, no stiffness; thyroid- normal to palpation  RESP: lungs clear to auscultation - no rales, no rhonchi, no wheezes  CV: regular rates and rhythm, normal S1 S2, no S3 or S4 and no murmur, no click or rub -  ABDOMEN: soft, no tenderness, no  hepatosplenomegaly, no masses, normal bowel sounds  Foot appears normal, no cracks or any ulcers.     ASSESSMENT/PLAN:                                                        ICD-10-CM    1. Type 2 diabetes mellitus without complication, without long-term current use of insulin (H) E11.9 HEMOGLOBIN A1C     Albumin Random Urine Quantitative with Creat Ratio   2. Screening for HIV (human immunodeficiency virus) Z11.4 HIV Screening   3. Morbid obesity, unspecified obesity type (H) E66.01    4. Screening for diabetic peripheral neuropathy Z13.89 FOOT EXAM  NO CHARGE [03420.114]     HEMOGLOBIN A1C     TSH WITH FREE T4 REFLEX     Comprehensive metabolic panel   5. Hyperlipidemia LDL goal <100 E78.5 Lipid panel reflex to direct LDL Fasting     Comprehensive metabolic panel   6. Hypertension goal BP (blood pressure) < 140/80 I10 Comprehensive metabolic panel       Patient labs ordered.  Once done we will follow-up on that.  His A1c slight worse.  He contributed to not eating very well and decreasing the medication on its own.  I will suggest continue the same dose of metformin thousand milligrams twice daily.  We will follow-up with him in 3 months.  Blood pressure is stable.  Refilled medication  Blood cholesterol labs are also ordered.  Once done we will follow-up on that and refilled once done.    Chris Zuluaga MD  [facility]  [place]

## 2018-06-14 LAB
ALBUMIN SERPL-MCNC: 4.2 G/DL (ref 3.4–5)
ALP SERPL-CCNC: 77 U/L (ref 40–150)
ALT SERPL W P-5'-P-CCNC: 83 U/L (ref 0–70)
ANION GAP SERPL CALCULATED.3IONS-SCNC: 12 MMOL/L (ref 3–14)
AST SERPL W P-5'-P-CCNC: 52 U/L (ref 0–45)
BILIRUB SERPL-MCNC: 0.9 MG/DL (ref 0.2–1.3)
BUN SERPL-MCNC: 16 MG/DL (ref 7–30)
CALCIUM SERPL-MCNC: 9.6 MG/DL (ref 8.5–10.1)
CHLORIDE SERPL-SCNC: 102 MMOL/L (ref 94–109)
CHOLEST SERPL-MCNC: 156 MG/DL
CO2 SERPL-SCNC: 24 MMOL/L (ref 20–32)
CREAT SERPL-MCNC: 0.77 MG/DL (ref 0.66–1.25)
GFR SERPL CREATININE-BSD FRML MDRD: >90 ML/MIN/1.7M2
GLUCOSE SERPL-MCNC: 152 MG/DL (ref 70–99)
HDLC SERPL-MCNC: 45 MG/DL
LDLC SERPL CALC-MCNC: 95 MG/DL
NONHDLC SERPL-MCNC: 111 MG/DL
POTASSIUM SERPL-SCNC: 4.2 MMOL/L (ref 3.4–5.3)
PROT SERPL-MCNC: 8.2 G/DL (ref 6.8–8.8)
SODIUM SERPL-SCNC: 138 MMOL/L (ref 133–144)
TRIGL SERPL-MCNC: 81 MG/DL
TSH SERPL DL<=0.005 MIU/L-ACNC: 1.35 MU/L (ref 0.4–4)

## 2018-06-14 RX ORDER — ATORVASTATIN CALCIUM 20 MG/1
20 TABLET, FILM COATED ORAL DAILY
Qty: 90 TABLET | Refills: 3 | Status: SHIPPED | OUTPATIENT
Start: 2018-06-14 | End: 2019-06-10

## 2018-06-15 LAB
CREAT UR-MCNC: 252 MG/DL
MICROALBUMIN UR-MCNC: 18 MG/L
MICROALBUMIN/CREAT UR: 7.06 MG/G CR (ref 0–17)

## 2018-08-16 DIAGNOSIS — I10 HYPERTENSION GOAL BP (BLOOD PRESSURE) < 140/80: ICD-10-CM

## 2018-08-16 RX ORDER — LISINOPRIL 20 MG/1
TABLET ORAL
Qty: 90 TABLET | Refills: 0 | OUTPATIENT
Start: 2018-08-16

## 2018-08-16 NOTE — TELEPHONE ENCOUNTER
Patient has refills on file at the pharmacy. Request denied.   Ria Blackmon RN   [facility] - Triage

## 2018-08-16 NOTE — TELEPHONE ENCOUNTER
"Requested Prescriptions   Pending Prescriptions Disp Refills     lisinopril (PRINIVIL/ZESTRIL) 20 MG tablet [Pharmacy Med Name: LISINOPRIL 20MG TABLETS]  Last Written Prescription Date:  6-  Last Fill Quantity: 90 tablet,  # refills: 3   Last office visit: 6/13/2018 with prescribing provider:     Future Office Visit:   Next 5 appointments (look out 90 days)     Sep 11, 2018  8:20 AM CDT   Office Visit with Chris Zuluaga MD   [facility] ([address] [id number] [phone]                  90 tablet 0     Sig: TAKE 1 TABLET(20 MG) BY MOUTH DAILY    ACE Inhibitors (Including Combos) Protocol Passed    8/16/2018  1:50 PM       Passed - Blood pressure under 140/90 in past 12 months    BP Readings from Last 3 Encounters:   06/13/18 124/80   07/14/17 138/82   11/02/16 136/84                Passed - Recent (12 mo) or future (30 days) visit within the authorizing provider's specialty    Patient had office visit in the last 12 months or has a visit in the next 30 days with authorizing provider or within the authorizing provider's specialty.  See \"Patient Info\" tab in inbasket, or \"Choose Columns\" in Meds & Orders section of the refill encounter.           Passed - Patient is age 18 or older       Passed - Normal serum creatinine on file in past 12 months    Recent Labs   Lab Test  06/13/18   0836   CR  0.77            Passed - Normal serum potassium on file in past 12 months    Recent Labs   Lab Test  06/13/18   0836   POTASSIUM  4.2               "

## 2018-09-07 DIAGNOSIS — E11.9 TYPE 2 DIABETES MELLITUS WITHOUT COMPLICATION, WITHOUT LONG-TERM CURRENT USE OF INSULIN (H): ICD-10-CM

## 2018-09-07 NOTE — TELEPHONE ENCOUNTER
Requested Prescriptions   Pending Prescriptions Disp Refills     metFORMIN (GLUCOPHAGE) 1000 MG tablet [Pharmacy Med Name: METFORMIN 1000MG TABLETS]  Last Written Prescription Date:  6-  Last Fill Quantity: 180 tablet,  # refills: 0   Last office visit: 6/13/2018 with prescribing provider:     Future Office Visit:   Next 5 appointments (look out 90 days)     Sep 11, 2018  8:20 AM CDT   Office Visit with Chris Zuluaga MD   [facility] ([address] [address]   [phone]                  180 tablet 0     Sig: TAKE 1 TABLET BY MOUTH TWICE DAILY WITH MEALS    Biguanide Agents Passed    9/7/2018 10:12 AM       Passed - Blood pressure less than 140/90 in past 6 months    BP Readings from Last 3 Encounters:   06/13/18 124/80   07/14/17 138/82   11/02/16 136/84                Passed - Patient has documented LDL within the past 12 mos.    Recent Labs   Lab Test  06/13/18   0836   LDL  95            Passed - Patient has had a Microalbumin in the past 15 mos.    Recent Labs   Lab Test  06/13/18   0837   MICROL  18   UMALCR  7.06            Passed - Patient is age 10 or older       Passed - Patient has documented A1c within the specified period of time.    If HgbA1C is 8 or greater, it needs to be on file within the past 3 months.  If less than 8, must be on file within the past 6 months.     Recent Labs   Lab Test  06/13/18   0836   A1C  7.2*            Passed - Patient's CR is NOT>1.4 OR Patient's EGFR is NOT<45 within past 12 mos.    Recent Labs   Lab Test  06/13/18   0836   GFRESTIMATED  >90   GFRESTBLACK  >90       Recent Labs   Lab Test  06/13/18   0836   CR  0.77            Passed - Patient does NOT have a diagnosis of CHF.       Passed - Recent (6 mo) or future (30 days) visit within the authorizing provider's specialty    Patient had office visit in the last 6 months or has a visit in the next 30 days with authorizing provider or  "within the authorizing provider's specialty.  See \"Patient Info\" tab in inbasket, or \"Choose Columns\" in Meds & Orders section of the refill encounter.              "

## 2018-09-07 NOTE — TELEPHONE ENCOUNTER
30 day supply given.  Patient is due for an OV.  Please call and assist with scheduling appointment prior to next refill   Ewa Roth RN - Triage  [facility]

## 2018-09-25 ENCOUNTER — OFFICE VISIT (OUTPATIENT)
Dept: FAMILY MEDICINE | Facility: CLINIC | Age: 63
End: 2018-09-25
Payer: COMMERCIAL

## 2018-09-25 VITALS
HEART RATE: 80 BPM | WEIGHT: 262 LBS | DIASTOLIC BLOOD PRESSURE: 80 MMHG | SYSTOLIC BLOOD PRESSURE: 132 MMHG | BODY MASS INDEX: 35.53 KG/M2 | TEMPERATURE: 97.2 F

## 2018-09-25 DIAGNOSIS — E11.9 TYPE 2 DIABETES MELLITUS WITHOUT COMPLICATION, WITHOUT LONG-TERM CURRENT USE OF INSULIN (H): ICD-10-CM

## 2018-09-25 DIAGNOSIS — Z23 NEED FOR PROPHYLACTIC VACCINATION AND INOCULATION AGAINST INFLUENZA: Primary | ICD-10-CM

## 2018-09-25 DIAGNOSIS — N52.9 ERECTILE DYSFUNCTION, UNSPECIFIED ERECTILE DYSFUNCTION TYPE: ICD-10-CM

## 2018-09-25 LAB — HBA1C MFR BLD: 7.3 % (ref 0–5.6)

## 2018-09-25 PROCEDURE — 99214 OFFICE O/P EST MOD 30 MIN: CPT | Mod: 25 | Performed by: FAMILY MEDICINE

## 2018-09-25 PROCEDURE — 90471 IMMUNIZATION ADMIN: CPT | Performed by: FAMILY MEDICINE

## 2018-09-25 PROCEDURE — 36415 COLL VENOUS BLD VENIPUNCTURE: CPT | Performed by: FAMILY MEDICINE

## 2018-09-25 PROCEDURE — 83036 HEMOGLOBIN GLYCOSYLATED A1C: CPT | Performed by: FAMILY MEDICINE

## 2018-09-25 PROCEDURE — 90686 IIV4 VACC NO PRSV 0.5 ML IM: CPT | Performed by: FAMILY MEDICINE

## 2018-09-25 RX ORDER — SILDENAFIL 50 MG/1
50 TABLET, FILM COATED ORAL DAILY PRN
Qty: 6 TABLET | Refills: 1 | Status: SHIPPED | OUTPATIENT
Start: 2018-09-25 | End: 2020-12-22

## 2018-09-25 RX ORDER — GLIPIZIDE 2.5 MG/1
2.5 TABLET, EXTENDED RELEASE ORAL DAILY
Qty: 90 TABLET | Refills: 1 | Status: SHIPPED | OUTPATIENT
Start: 2018-09-25 | End: 2019-02-06

## 2018-09-25 NOTE — PROGRESS NOTES
SUBJECTIVE:   Pancho Costello is a 63 year old male who presents to clinic today for the following health issues:      Diabetes Follow-up  Currently on metformin thousand milligrams twice daily.  Last time his hemoglobin A1c slight elevated.  At that time we kept him on the same dose of metformin he denies any side effects.  His most of the morning readings are stable and well controlled.    Patient is checking blood sugars: 3-4 times a week. Ranging from 120-135    Diabetic concerns: None     Symptoms of hypoglycemia (low blood sugar): shaky, dizzy, weak, rare, when golfing in warm temps     Paresthesias (numbness or burning in feet) or sores: Yes right side     Date of last diabetic eye exam: 5/2017    BP Readings from Last 2 Encounters:   09/25/18 132/80   06/13/18 124/80     Hemoglobin A1C (%)   Date Value   09/25/2018 7.3 (H)   06/13/2018 7.2 (H)     LDL Cholesterol Calculated (mg/dL)   Date Value   06/13/2018 95   07/14/2017 148 (H)       Diabetes Management Resources    Amount of exercise or physical activity: 4 times per week    Problems taking medications regularly: No    Medication side effects: none    Diet: low carb             Problem list and histories reviewed & adjusted, as indicated.  Additional history: as documented        Reviewed and updated as needed this visit by clinical staff  Tobacco  Allergies  Meds       Reviewed and updated as needed this visit by Provider         ROS:  CONSTITUTIONAL: NEGATIVE for fever, chills, change in weight  ENT/MOUTH: NEGATIVE for ear, mouth and throat problems  RESP: NEGATIVE for significant cough or SOB  CV: NEGATIVE for chest pain, palpitations or peripheral edema    OBJECTIVE:                                                    /80  Pulse 80  Temp 97.2  F (36.2  C) (Tympanic)  Wt 262 lb (118.8 kg)  BMI 35.53 kg/m2  Body mass index is 35.53 kg/(m^2).   GENERAL: healthy, alert, well nourished, well hydrated, no distress  NECK: no tenderness, no  adenopathy, no asymmetry, no masses, no stiffness; thyroid- normal to palpation  RESP: lungs clear to auscultation - no rales, no rhonchi, no wheezes  CV: regular rates and rhythm, normal S1 S2, no S3 or S4 and no murmur, no click or rub -  ABDOMEN: soft, no tenderness, no  hepatosplenomegaly, no masses, normal bowel sounds         ASSESSMENT/PLAN:                                                        ICD-10-CM    1. Need for prophylactic vaccination and inoculation against influenza Z23 FLU VACCINE, SPLIT VIRUS, IM (QUADRIVALENT) [36392]- >3 YRS     Vaccine Administration, Initial [72864]   2. Type 2 diabetes mellitus without complication, without long-term current use of insulin (H) E11.9 Hemoglobin A1c     metFORMIN (GLUCOPHAGE) 1000 MG tablet     glipiZIDE (GLIPIZIDE XL) 2.5 MG 24 hr tablet   3. Erectile dysfunction, unspecified erectile dysfunction type N52.9 sildenafil (VIAGRA) 50 MG tablet       Patient hemoglobin A1c is 7.3 which is stable when compared to last time but it has not improved.  He is currently on metformin thousand milligrams twice daily.  I suggested he should continue that but we will add a small dose called glipizide to his regimen.  Work on your diet and do regular exercise.  Follow-up in 3 months for recheck.  Patient complains of erectile dysfunction.  He wanted to try Viagra or similar medication.  Viagra prescription given.  He is advised of side effects including low blood pressure headache.  If there is any change in symptoms while taking Viagra he will let me know.  Follow-up if any new or change in symptoms.    Chris Zuluaga MD  [facility]    Injectable Influenza Immunization Documentation    1.  Is the person to be vaccinated sick today?   No    2. Does the person to be vaccinated have an allergy to a component   of the vaccine?   No  Egg Allergy Algorithm Link    3. Has the person to be vaccinated ever had a serious reaction   to influenza vaccine in the past?    No    4. Has the person to be vaccinated ever had Guillain-Barré syndrome?   No    Form completed by Jean VILLEDA CMA

## 2018-09-25 NOTE — MR AVS SNAPSHOT
After Visit Summary   9/25/2018    Pancho Costello    MRN: [id number]           Patient Information     Date Of Birth          1955        Visit Information        Provider Department      9/25/2018 8:40 AM Chris Zuluaga MD [facility]        Today's Diagnoses     Need for prophylactic vaccination and inoculation against influenza    -  1    Type 2 diabetes mellitus without complication, without long-term current use of insulin (H)        Erectile dysfunction, unspecified erectile dysfunction type           Follow-ups after your visit        Follow-up notes from your care team     Return in about 3 months (around 12/25/2018) for Diabeties check.      Your next 10 appointments already scheduled     Jan 08, 2019  8:40 AM CST   Office Visit with Chris Zuluaga MD   [facility] ([facility])    [address] [place] [id number] [phone]           Bring a current list of meds and any records pertaining to this visit. For Physicals, please bring immunization records and any forms needing to be filled out. Please arrive 10 minutes early to complete paperwork.              Who to contact     If you have questions or need follow up information about today's clinic visit or your schedule please contact [facility] directly at [phone].  Normal or non-critical lab and imaging results will be communicated to you by MyChart, letter or phone within 4 business days after the clinic has received the results. If you do not hear from us within 7 days, please contact the clinic through MyChart or phone. If you have a critical or abnormal lab result, we will notify you by phone as soon as possible.  Submit refill requests through [employer] or call your pharmacy and they will forward the refill request to us. Please allow 3 business days for your refill to be completed.          Additional Information About Your Visit         Victrix Information     [employer] gives you secure access to your electronic health record. If you see a primary care provider, you can also send messages to your care team and make appointments. If you have questions, please call your primary care clinic.  If you do not have a primary care provider, please call [phone] and they will assist you.        Care EveryWhere ID     This is your Care EveryWhere ID. This could be used by other organizations to access your [place] [facility] medical records  [id number]        Your Vitals Were     Pulse Temperature BMI (Body Mass Index)             80 97.2  F (36.2  C) (Tympanic) 35.53 kg/m2          Blood Pressure from Last 3 Encounters:   09/25/18 132/80   06/13/18 124/80   07/14/17 138/82    Weight from Last 3 Encounters:   09/25/18 262 lb (118.8 kg)   06/13/18 260 lb (117.9 kg)   07/14/17 261 lb (118.4 kg)              We Performed the Following     FLU VACCINE, SPLIT VIRUS, IM (QUADRIVALENT) [50776]- >3 YRS     Hemoglobin A1c     Vaccine Administration, Initial [20294]          Today's Medication Changes          These changes are accurate as of 9/25/18  9:33 AM.  If you have any questions, ask your nurse or doctor.               Start taking these medicines.        Dose/Directions    glipiZIDE 2.5 MG 24 hr tablet   Commonly known as:  glipiZIDE XL   Used for:  Type 2 diabetes mellitus without complication, without long-term current use of insulin (H)   Started by:  Chris Zuluaga MD        Dose:  2.5 mg   Take 1 tablet (2.5 mg) by mouth daily   Quantity:  90 tablet   Refills:  1       sildenafil 50 MG tablet   Commonly known as:  VIAGRA   Used for:  Erectile dysfunction, unspecified erectile dysfunction type   Started by:  Chris Zuluaga MD        Dose:  50 mg   Take 1 tablet (50 mg) by mouth daily as needed (as needed) 30 min to 4 hrs before sex. Do not use with nitroglycerin, terazosin or doxazosin.   Quantity:  6 tablet   Refills:  1            Where to get your medicines       These medications were sent to [id number] - [place], [place] - [address] AT [facility] OF  [address]  [address], [place] [address]     Phone:  [phone]     glipiZIDE 2.5 MG 24 hr tablet    metFORMIN 1000 MG tablet         Some of these will need a paper prescription and others can be bought over the counter.  Ask your nurse if you have questions.     Bring a paper prescription for each of these medications     sildenafil 50 MG tablet                Primary Care Provider Office Phone # Fax #    Chris Zuluaga [phone] [address]  [place] [address]        Equal Access to Services     PURVI ALVA : Hadii aad ku hadasho Soomaali, waaxda luqadaha, qaybta kaalmada adeegyada, isabel nava. So [facility] [phone].    ATENCIÓN: Si habla español, tiene a ruvalcaba disposición servicios gratuitos de asistencia lingüística. [facility] [phone].    We comply with applicable federal civil rights laws and [place] laws. We do not discriminate on the basis of race, color, national origin, age, disability, sex, sexual orientation, or gender identity.            Thank you!     Thank you for choosing [facility] [place]  for your care. Our goal is always to provide you with excellent care. Hearing back from our patients is one way we can continue to improve our services. Please take a few minutes to complete the written survey that you may receive in the mail after your visit with us. Thank you!             Your Updated Medication List - Protect others around you: Learn how to safely use, store and throw away your medicines at www.disposemymeds.org.          This list is accurate as of 9/25/18  9:33 AM.  Always use your most recent med list.                   Brand Name Dispense Instructions for use Diagnosis    aspirin 81 MG tablet     30 tablet    Take 1 tablet (81 mg) by mouth daily    Type 2  diabetes, HbA1C goal < 8% (H)       atorvastatin 20 MG tablet    LIPITOR    90 tablet    Take 1 tablet (20 mg) by mouth daily    Hyperlipidemia LDL goal <100       blood glucose monitoring lancets     100 each    Test 1 times a day    Type 2 diabetes mellitus without complication, without long-term current use of insulin (H)       blood glucose monitoring test strip    ACCU-CHEK SMARTVIEW    100 each    Testing 1-2 times per week    Type 2 diabetes mellitus without complication, without long-term current use of insulin (H)       glipiZIDE 2.5 MG 24 hr tablet    glipiZIDE XL    90 tablet    Take 1 tablet (2.5 mg) by mouth daily    Type 2 diabetes mellitus without complication, without long-term current use of insulin (H)       lisinopril 20 MG tablet    PRINIVIL/ZESTRIL    90 tablet    TAKE 1 TABLET(20 MG) BY MOUTH DAILY    Hypertension goal BP (blood pressure) < 140/80       metFORMIN 1000 MG tablet    GLUCOPHAGE    180 tablet    TAKE 1 TABLET BY MOUTH TWICE DAILY WITH MEALS    Type 2 diabetes mellitus without complication, without long-term current use of insulin (H)       sildenafil 50 MG tablet    VIAGRA    6 tablet    Take 1 tablet (50 mg) by mouth daily as needed (as needed) 30 min to 4 hrs before sex. Do not use with nitroglycerin, terazosin or doxazosin.    Erectile dysfunction, unspecified erectile dysfunction type

## 2019-02-06 ENCOUNTER — OFFICE VISIT (OUTPATIENT)
Dept: FAMILY MEDICINE | Facility: CLINIC | Age: 64
End: 2019-02-06
Payer: COMMERCIAL

## 2019-02-06 VITALS
DIASTOLIC BLOOD PRESSURE: 80 MMHG | WEIGHT: 265 LBS | HEART RATE: 72 BPM | TEMPERATURE: 97.7 F | BODY MASS INDEX: 35.94 KG/M2 | SYSTOLIC BLOOD PRESSURE: 140 MMHG

## 2019-02-06 DIAGNOSIS — E11.9 TYPE 2 DIABETES MELLITUS WITHOUT COMPLICATION, WITHOUT LONG-TERM CURRENT USE OF INSULIN (H): ICD-10-CM

## 2019-02-06 DIAGNOSIS — E66.01 MORBID OBESITY, UNSPECIFIED OBESITY TYPE (H): ICD-10-CM

## 2019-02-06 DIAGNOSIS — E78.5 HYPERLIPIDEMIA LDL GOAL <100: Primary | ICD-10-CM

## 2019-02-06 DIAGNOSIS — I10 HYPERTENSION GOAL BP (BLOOD PRESSURE) < 140/80: ICD-10-CM

## 2019-02-06 LAB — HBA1C MFR BLD: 6.8 % (ref 0–5.6)

## 2019-02-06 PROCEDURE — 36415 COLL VENOUS BLD VENIPUNCTURE: CPT | Performed by: FAMILY MEDICINE

## 2019-02-06 PROCEDURE — 83036 HEMOGLOBIN GLYCOSYLATED A1C: CPT | Performed by: FAMILY MEDICINE

## 2019-02-06 PROCEDURE — 99214 OFFICE O/P EST MOD 30 MIN: CPT | Performed by: FAMILY MEDICINE

## 2019-02-06 RX ORDER — GLIPIZIDE 2.5 MG/1
2.5 TABLET, EXTENDED RELEASE ORAL DAILY
Qty: 90 TABLET | Refills: 1 | Status: SHIPPED | OUTPATIENT
Start: 2019-02-06 | End: 2019-07-02

## 2019-02-06 NOTE — PROGRESS NOTES
SUBJECTIVE:   Pancho Costello is a 63 year old male who presents to clinic today for the following health issues:      Diabetes Follow-up  Patient has been checking his blood sugars.  Mornings are usually under 130.  Currently on metformin thousand milligrams twice daily along with glipizide extended release 2.5 mg.    Patient is checking blood sugars: 3 times a week    Diabetic concerns: None     Symptoms of hypoglycemia (low blood sugar): none     Paresthesias (numbness or burning in feet) or sores:no       Date of last diabetic eye exam: 5/2017    BP Readings from Last 2 Encounters:   02/06/19 140/80   09/25/18 132/80     Hemoglobin A1C (%)   Date Value   02/06/2019 6.8 (H)   09/25/2018 7.3 (H)     LDL Cholesterol Calculated (mg/dL)   Date Value   06/13/2018 95   07/14/2017 148 (H)       Diabetes Management Resources    Amount of exercise or physical activity: walking 5 days per week    Problems taking medications regularly: No    Medication side effects: none    Diet: regular (no restrictions)            Problem list and histories reviewed & adjusted, as indicated.  Additional history: as documented    Patient Active Problem List   Diagnosis     Hypertension goal BP (blood pressure) < 140/80     Hyperlipidemia LDL goal <100     Advanced directives, counseling/discussion     Type 2 diabetes mellitus without complication, without long-term current use of insulin (H)     Morbid obesity, unspecified obesity type (H)     Past Surgical History:   Procedure Laterality Date     LIGATN/STRIP LONG & SHORT SAPHEN      2000       Social History     Tobacco Use     Smoking status: Never Smoker     Smokeless tobacco: Never Used   Substance Use Topics     Alcohol use: Yes     Alcohol/week: 0.0 oz     Family History   Problem Relation Age of Onset     Heart Failure Mother      Breast Cancer Mother      Diabetes Mother          Current Outpatient Medications   Medication Sig Dispense Refill     aspirin 81 MG tablet Take 1 tablet  (81 mg) by mouth daily 30 tablet 11     atorvastatin (LIPITOR) 20 MG tablet Take 1 tablet (20 mg) by mouth daily 90 tablet 3     glipiZIDE (GLIPIZIDE XL) 2.5 MG 24 hr tablet Take 1 tablet (2.5 mg) by mouth daily 90 tablet 1     lisinopril (PRINIVIL/ZESTRIL) 20 MG tablet TAKE 1 TABLET(20 MG) BY MOUTH DAILY 90 tablet 3     metFORMIN (GLUCOPHAGE) 1000 MG tablet TAKE 1 TABLET BY MOUTH TWICE DAILY WITH MEALS 180 tablet 1     sildenafil (VIAGRA) 50 MG tablet Take 1 tablet (50 mg) by mouth daily as needed (as needed) 30 min to 4 hrs before sex. Do not use with nitroglycerin, terazosin or doxazosin. 6 tablet 1     blood glucose monitoring (ACCU-CHEK FASTCLIX) lancets Test 1 times a day 100 each 1     blood glucose monitoring (ACCU-CHEK SMARTVIEW) test strip Testing 1-2 times per week 100 each 1       Reviewed and updated as needed this visit by clinical staff  Tobacco  Allergies  Meds       Reviewed and updated as needed this visit by Provider         ROS:  CONSTITUTIONAL: NEGATIVE for fever, chills, change in weight  ENT/MOUTH: NEGATIVE for ear, mouth and throat problems  RESP: NEGATIVE for significant cough or SOB  CV: NEGATIVE for chest pain, palpitations or peripheral edema    OBJECTIVE:                                                    /80   Pulse 72   Temp 97.7  F (36.5  C) (Tympanic)   Wt 120.2 kg (265 lb)   BMI 35.94 kg/m    Body mass index is 35.94 kg/m .   GENERAL: healthy, alert, well nourished, well hydrated, no distress  HENT: ear canals- normal; TMs- normal; Nose- normal; Mouth- no ulcers, no lesions  NECK: no tenderness, no adenopathy, no asymmetry, no masses, no stiffness; thyroid- normal to palpation  RESP: lungs clear to auscultation - no rales, no rhonchi, no wheezes  CV: regular rates and rhythm, normal S1 S2, no S3 or S4 and no murmur, no click or rub -  ABDOMEN: soft, no tenderness, no  hepatosplenomegaly, no masses, normal bowel sounds         ASSESSMENT/PLAN:                                                         ICD-10-CM    1. Hyperlipidemia LDL goal <100 E78.5    2. Morbid obesity, unspecified obesity type (H) E66.01 Hemoglobin A1c   3. Type 2 diabetes mellitus without complication, without long-term current use of insulin (H) E11.9 Hemoglobin A1c     glipiZIDE (GLIPIZIDE XL) 2.5 MG 24 hr tablet     metFORMIN (GLUCOPHAGE) 1000 MG tablet   4. Hypertension goal BP (blood pressure) < 140/80 I10        Patient hemoglobin A1c is slight stable.  It is in fact slightly improved from the previous one.  I suggested continue the same dose of medication glipizide and metformin.  Follow-up in 6 months for recheck and physical.  Continue to work on your diet and exercise.  Lose some weight.    Chris Zuluaga MD  [facility] [place] [place]

## 2019-02-13 ENCOUNTER — MYC REFILL (OUTPATIENT)
Dept: FAMILY MEDICINE | Facility: CLINIC | Age: 64
End: 2019-02-13

## 2019-02-13 DIAGNOSIS — E11.9 TYPE 2 DIABETES MELLITUS WITHOUT COMPLICATION, WITHOUT LONG-TERM CURRENT USE OF INSULIN (H): ICD-10-CM

## 2019-02-13 RX ORDER — LANCETS
EACH MISCELLANEOUS
Qty: 100 EACH | Refills: 1 | Status: SHIPPED | OUTPATIENT
Start: 2019-02-13

## 2019-02-13 NOTE — TELEPHONE ENCOUNTER
"blood glucose (ACCU-CHEK SMARTVIEW) test strip    Last Written Prescription Date:  7/14/2017  Last Fill Quantity: 100,  # refills: 1   Last office visit: 2/6/2019 with prescribing provider: Dr. Zuluaga   Future Office Visit:      Requested Prescriptions   Pending Prescriptions Disp Refills     blood glucose (ACCU-CHEK SMARTVIEW) test strip 100 each 1     Sig: Testing 1-2 times per week    Diabetic Supplies Protocol Passed - 2/13/2019 11:55 AM       Passed - Medication is active on med list       Passed - Patient is 18 years of age or older       Passed - Recent (6 mo) or future (30 days) visit within the authorizing provider's specialty    Patient had office visit in the last 6 months or has a visit in the next 30 days with authorizing provider.  See \"Patient Info\" tab in inbasket, or \"Choose Columns\" in Meds & Orders section of the refill encounter.           blood glucose monitoring (ACCU-CHEK FASTCLIX) lancets    Last Written Prescription Date:  7/14/2017  Last Fill Quantity: 100,  # refills: 1   Last office visit: 2/6/2019 with prescribing provider:  Dr. Zuluaga   Future Office Visit:           blood glucose monitoring (ACCU-CHEK FASTCLIX) lancets 100 each 1     Sig: Test 1 times a day    Diabetic Supplies Protocol Passed - 2/13/2019 11:55 AM       Passed - Medication is active on med list       Passed - Patient is 18 years of age or older       Passed - Recent (6 mo) or future (30 days) visit within the authorizing provider's specialty    Patient had office visit in the last 6 months or has a visit in the next 30 days with authorizing provider.  See \"Patient Info\" tab in inbasket, or \"Choose Columns\" in Meds & Orders section of the refill encounter.            Prescription approved per [facility] [place] [facility] to the [facility] Refill Protocol.  Aviva LANDERSN, RN   [facility]     "

## 2019-02-13 NOTE — TELEPHONE ENCOUNTER
Pharmacy asking for clarification on two different sigs.  Lancets say one time per day and test strips says pt testing 1 - 2 times per week. What would you like? Pt was last in 2/9/19 and said he was testing maybe 3 times a week.

## 2019-02-14 NOTE — TELEPHONE ENCOUNTER
Pharmacy has been notified. Jean VILLEDA, CMA     Minimal epigastric TTP, no guarding. Bowel sounds present.

## 2019-03-07 ENCOUNTER — TELEPHONE (OUTPATIENT)
Dept: FAMILY MEDICINE | Facility: CLINIC | Age: 64
End: 2019-03-07

## 2019-03-07 NOTE — TELEPHONE ENCOUNTER
Needs of attention regarding:  -Diabetes  -High Blood Pressure    Health Maintenance Topics with due status: Overdue       Topic Date Due    ZOSTER IMMUNIZATION 03/04/2014    PREVENTIVE CARE VISIT 11/02/2017    EYE EXAM Q1 YEAR 05/01/2018       Communication:  See MyChart message - pt needs to return for nurse only bp check

## 2019-03-07 NOTE — LETTER
April 2, 2019      Pancho Costello  [address]  [place] [address]        Dear Pancho,    I care about your health and have reviewed your health plan. I have reviewed your medical conditions, medication list, and lab results and am making recommendations based on this review, to better manage your health.    You are in particular need of attention regarding:  -High Blood Pressure    I am recommending that you:  -schedule a NURSE-ONLY BLOOD PRESSURE APPOINTMENT within the next 1-4 weeks.     BP Readings from Last 3 Encounters:   02/06/19 140/80   09/25/18 132/80   06/13/18 124/80     Please call us at [phone] (or use [email or web address]) to address the above recommendations.     Thank you for trusting [facility] and we appreciate the opportunity to serve you.  We look forward to supporting your healthcare needs in the future.    Healthy Regards,    Chris Zuluaga MD/Jean VILLEDA CMA

## 2019-06-10 DIAGNOSIS — E78.5 HYPERLIPIDEMIA LDL GOAL <100: ICD-10-CM

## 2019-06-10 RX ORDER — ATORVASTATIN CALCIUM 20 MG/1
TABLET, FILM COATED ORAL
Qty: 30 TABLET | Refills: 0 | Status: SHIPPED | OUTPATIENT
Start: 2019-06-10 | End: 2019-07-02

## 2019-06-10 NOTE — TELEPHONE ENCOUNTER
"Requested Prescriptions   Pending Prescriptions Disp Refills     atorvastatin (LIPITOR) 20 MG tablet [Pharmacy Med Name: ATORVASTATIN 20MG TABLETS] 90 tablet 0     Sig: TAKE 1 TABLET(20 MG) BY MOUTH DAILY       Statins Protocol Passed - 6/10/2019  9:40 AM        Passed - LDL on file in past 12 months     Recent Labs   Lab Test 06/13/18  0836   LDL 95             Passed - No abnormal creatine kinase in past 12 months     No lab results found.             Passed - Recent (12 mo) or future (30 days) visit within the authorizing provider's specialty     Patient had office visit in the last 12 months or has a visit in the next 30 days with authorizing provider or within the authorizing provider's specialty.  See \"Patient Info\" tab in inbasket, or \"Choose Columns\" in Meds & Orders section of the refill encounter.              Passed - Medication is active on med list        Passed - Patient is age 18 or older        atorvastatin (LIPITOR) 20 MG tablet 90 tablet 3 6/14/2018       Last Written Prescription Date:  06/14/2018  Last Fill Quantity: 90,  # refills: 3   Last office visit: 2/6/2019 with prescribing provider:  Dr. Zuluaga   Future Office Visit:  Unknown     "

## 2019-06-10 NOTE — TELEPHONE ENCOUNTER
Patient due for fasting office visit- 30 days supply given.  Routing to team to schedule appointment     Blanca Nick RN  [facility]  [phone]

## 2019-07-02 ENCOUNTER — OFFICE VISIT (OUTPATIENT)
Dept: FAMILY MEDICINE | Facility: CLINIC | Age: 64
End: 2019-07-02
Payer: COMMERCIAL

## 2019-07-02 VITALS
WEIGHT: 260 LBS | DIASTOLIC BLOOD PRESSURE: 80 MMHG | SYSTOLIC BLOOD PRESSURE: 150 MMHG | BODY MASS INDEX: 35.21 KG/M2 | HEIGHT: 72 IN | OXYGEN SATURATION: 95 % | TEMPERATURE: 98.1 F | HEART RATE: 90 BPM

## 2019-07-02 DIAGNOSIS — R79.89 ELEVATED LIVER FUNCTION TESTS: ICD-10-CM

## 2019-07-02 DIAGNOSIS — Z13.220 SCREENING FOR HYPERLIPIDEMIA: Primary | ICD-10-CM

## 2019-07-02 DIAGNOSIS — I10 HYPERTENSION GOAL BP (BLOOD PRESSURE) < 140/80: ICD-10-CM

## 2019-07-02 DIAGNOSIS — E66.01 MORBID OBESITY (H): ICD-10-CM

## 2019-07-02 DIAGNOSIS — E78.5 HYPERLIPIDEMIA LDL GOAL <100: ICD-10-CM

## 2019-07-02 DIAGNOSIS — E11.9 TYPE 2 DIABETES MELLITUS WITHOUT COMPLICATION, WITHOUT LONG-TERM CURRENT USE OF INSULIN (H): ICD-10-CM

## 2019-07-02 LAB
ALBUMIN SERPL-MCNC: 3.9 G/DL (ref 3.4–5)
ALP SERPL-CCNC: 88 U/L (ref 40–150)
ALT SERPL W P-5'-P-CCNC: 94 U/L (ref 0–70)
ANION GAP SERPL CALCULATED.3IONS-SCNC: 8 MMOL/L (ref 3–14)
AST SERPL W P-5'-P-CCNC: 64 U/L (ref 0–45)
BILIRUB SERPL-MCNC: 0.7 MG/DL (ref 0.2–1.3)
BUN SERPL-MCNC: 10 MG/DL (ref 7–30)
CALCIUM SERPL-MCNC: 9.2 MG/DL (ref 8.5–10.1)
CHLORIDE SERPL-SCNC: 102 MMOL/L (ref 94–109)
CHOLEST SERPL-MCNC: 149 MG/DL
CO2 SERPL-SCNC: 25 MMOL/L (ref 20–32)
CREAT SERPL-MCNC: 0.81 MG/DL (ref 0.66–1.25)
CREAT UR-MCNC: 326 MG/DL
GFR SERPL CREATININE-BSD FRML MDRD: >90 ML/MIN/{1.73_M2}
GLUCOSE SERPL-MCNC: 169 MG/DL (ref 70–99)
HBA1C MFR BLD: 7.2 % (ref 0–5.6)
HDLC SERPL-MCNC: 41 MG/DL
LDLC SERPL CALC-MCNC: 88 MG/DL
MICROALBUMIN UR-MCNC: 90 MG/L
MICROALBUMIN/CREAT UR: 27.64 MG/G CR (ref 0–17)
NONHDLC SERPL-MCNC: 108 MG/DL
POTASSIUM SERPL-SCNC: 4.3 MMOL/L (ref 3.4–5.3)
PROT SERPL-MCNC: 7.9 G/DL (ref 6.8–8.8)
SODIUM SERPL-SCNC: 135 MMOL/L (ref 133–144)
TRIGL SERPL-MCNC: 99 MG/DL

## 2019-07-02 PROCEDURE — 80061 LIPID PANEL: CPT | Performed by: FAMILY MEDICINE

## 2019-07-02 PROCEDURE — 36415 COLL VENOUS BLD VENIPUNCTURE: CPT | Performed by: FAMILY MEDICINE

## 2019-07-02 PROCEDURE — 82043 UR ALBUMIN QUANTITATIVE: CPT | Performed by: FAMILY MEDICINE

## 2019-07-02 PROCEDURE — 80053 COMPREHEN METABOLIC PANEL: CPT | Performed by: FAMILY MEDICINE

## 2019-07-02 PROCEDURE — 83036 HEMOGLOBIN GLYCOSYLATED A1C: CPT | Performed by: FAMILY MEDICINE

## 2019-07-02 PROCEDURE — 99214 OFFICE O/P EST MOD 30 MIN: CPT | Performed by: FAMILY MEDICINE

## 2019-07-02 RX ORDER — LISINOPRIL 20 MG/1
TABLET ORAL
Qty: 90 TABLET | Refills: 3 | Status: SHIPPED | OUTPATIENT
Start: 2019-07-02 | End: 2020-07-22

## 2019-07-02 RX ORDER — ATORVASTATIN CALCIUM 20 MG/1
20 TABLET, FILM COATED ORAL DAILY
Qty: 30 TABLET | Refills: 0 | Status: SHIPPED | OUTPATIENT
Start: 2019-07-02 | End: 2019-08-02

## 2019-07-02 RX ORDER — GLIPIZIDE 5 MG/1
5 TABLET, FILM COATED, EXTENDED RELEASE ORAL DAILY
Qty: 90 TABLET | Refills: 1 | Status: SHIPPED | OUTPATIENT
Start: 2019-07-02 | End: 2019-12-30

## 2019-07-02 ASSESSMENT — MIFFLIN-ST. JEOR: SCORE: 2017.35

## 2019-07-02 NOTE — PROGRESS NOTES
Subjective     Pancho Costello is a 63 year old male who presents to clinic today for the following health issues:    HPI   Diabetes Follow-up  Patient came for diabetes recheck.  Metformin is taking thousand milligrams twice daily along with glipizide 2.5 mg.  His blood sugars are running between 1 40-1 50 most of the time.  He feels he has been slight sick for the last 2 weeks which may have him into a little and otherwise he thinks his blood sugars are relatively stable.    Blood pressure check:  She is currently on lisinopril 20 mg.  Denies any side effects.  Is been taking cold medication which may have triggered his blood pressure to be on the upper side.  He would like to come back for nurse only blood pressure check before changing any medication which is reasonable.    How often are you checking your blood sugar? One time per week     What time of day are you checking your blood sugars (select all that apply)?  Before meals    Have you had any blood sugars above 200?  No    Have you had any blood sugars below 70?  No    What symptoms do you notice when your blood sugar is low?  Dizzy    What concerns do you have today about your diabetes? None     Do you have any of these symptoms? (Select all that apply)  Numbness in feet on and off      Have you had a diabetic eye exam in the last 12 months? No    BP Readings from Last 2 Encounters:   07/02/19 150/80   02/06/19 140/80     Hemoglobin A1C (%)   Date Value   02/06/2019 6.8 (H)   09/25/2018 7.3 (H)     LDL Cholesterol Calculated (mg/dL)   Date Value   06/13/2018 95   07/14/2017 148 (H)       Diabetes Management Resources        PROBLEMS TO ADD ON...    Patient Active Problem List   Diagnosis     Hypertension goal BP (blood pressure) < 140/80     Hyperlipidemia LDL goal <100     Advanced directives, counseling/discussion     Type 2 diabetes mellitus without complication, without long-term current use of insulin (H)     Morbid obesity (H)     Past Surgical  History:   Procedure Laterality Date     LIGATN/STRIP LONG & SHORT SAPHEN      2000       Social History     Tobacco Use     Smoking status: Never Smoker     Smokeless tobacco: Never Used   Substance Use Topics     Alcohol use: Yes     Alcohol/week: 0.0 oz     Family History   Problem Relation Age of Onset     Heart Failure Mother      Breast Cancer Mother      Diabetes Mother            PROBLEMS TO ADD ON...  Reviewed and updated as needed this visit by Provider         Review of Systems   ROS COMP: Constitutional, HEENT, cardiovascular, pulmonary, gi and gu systems are negative, except as otherwise noted.      Objective    /80   Pulse 90   Temp 98.1  F (36.7  C) (Tympanic)   Ht 1.829 m (6')   Wt 117.9 kg (260 lb)   SpO2 95%   BMI 35.26 kg/m    Body mass index is 35.26 kg/m .  Physical Exam   GENERAL: healthy, alert and no distress  NECK: no adenopathy, no asymmetry, masses, or scars and thyroid normal to palpation  RESP: lungs clear to auscultation - no rales, rhonchi or wheezes  CV: regular rate and rhythm, normal S1 S2, no S3 or S4, no murmur, click or rub, no peripheral edema and peripheral pulses strong  ABDOMEN: soft, nontender, no hepatosplenomegaly, no masses and bowel sounds normal  MS: no gross musculoskeletal defects noted, no edema    Diagnostic Test Results:  Labs reviewed in Epic        Assessment & Plan     1. Screening for hyperlipidemia    - Lipid panel reflex to direct LDL Fasting    2. Hypertension goal BP (blood pressure) < 140/80  Advised to have blood pressure recheck with nurse only in 2 weeks.  If this continue to be elevated will think of adding small dose of hydrochlorothiazide to the current combination.  - Comprehensive metabolic panel  - lisinopril (PRINIVIL/ZESTRIL) 20 MG tablet; TAKE 1 TABLET(20 MG) BY MOUTH DAILY  Dispense: 90 tablet; Refill: 3    3. Type 2 diabetes mellitus without complication, without long-term current use of insulin (H)  Blood sugars are slightly  worse than last time.  I have increased the dose of glipizide from 2.5 to 5 mg.  Follow-up in 3 to 6 months for recheck.  Work on your diet and regular exercise.  - Albumin Random Urine Quantitative with Creat Ratio  - Comprehensive metabolic panel  - Hemoglobin A1c  - metFORMIN (GLUCOPHAGE) 1000 MG tablet; TAKE 1 TABLET BY MOUTH TWICE DAILY WITH MEALS  Dispense: 180 tablet; Refill: 1  - glipiZIDE (GLUCOTROL XL) 5 MG 24 hr tablet; Take 1 tablet (5 mg) by mouth daily  Dispense: 90 tablet; Refill: 1    4. Hyperlipidemia LDL goal <100    - Lipid panel reflex to direct LDL Fasting  - atorvastatin (LIPITOR) 20 MG tablet; Take 1 tablet (20 mg) by mouth daily  Dispense: 30 tablet; Refill: 0    5. Morbid obesity (H)         BMI:   Estimated body mass index is 35.26 kg/m  as calculated from the following:    Height as of this encounter: 1.829 m (6').    Weight as of this encounter: 117.9 kg (260 lb).     Chris Zuluaga MD  [facility] [place] [facility] [place]

## 2019-07-02 NOTE — LETTER
July 9, 2019      Pancho VILLEDA Pravin  [address] [facility]  [place] [address]        Dear ,      I have reviewed your recent labs. Here are the results:     -Cholesterol levels (LDL,HDL, Triglycerides) are normal.  ADVISE: rechecking in 1 year.   -A1C test (average blood sugar the last 2-3 months) is above your goal.   ADVISE: making a diabetic followup appointment in 6 months. Please check and record your blood sugars at least 2-3 times daily for 1 week prior to your appointment and bring for review.  Also, you should recheck your A1C in 3-6 months.   -Kidney functions are normal.   -liver functions on the upper side they have been on the upper side in the past as well. Better diabetes and eating healthy and avoiding excessive alcohol will help.  Suggest rechecking again in 1 month.  If this is continued.  I would suggest ultrasound of the liver    Resulted Orders   Lipid panel reflex to direct LDL Fasting   Result Value Ref Range    Cholesterol 149 <200 mg/dL    Triglycerides 99 <150 mg/dL      Comment:      Fasting specimen    HDL Cholesterol 41 >39 mg/dL    LDL Cholesterol Calculated 88 <100 mg/dL      Comment:      Desirable:       <100 mg/dl    Non HDL Cholesterol 108 <130 mg/dL   Albumin Random Urine Quantitative with Creat Ratio   Result Value Ref Range    Creatinine Urine 326 mg/dL    Albumin Urine mg/L 90 mg/L    Albumin Urine mg/g Cr 27.64 (H) 0 - 17 mg/g Cr   Comprehensive metabolic panel   Result Value Ref Range    Sodium 135 133 - 144 mmol/L    Potassium 4.3 3.4 - 5.3 mmol/L    Chloride 102 94 - 109 mmol/L    Carbon Dioxide 25 20 - 32 mmol/L    Anion Gap 8 3 - 14 mmol/L    Glucose 169 (H) 70 - 99 mg/dL      Comment:      Fasting specimen    Urea Nitrogen 10 7 - 30 mg/dL    Creatinine 0.81 0.66 - 1.25 mg/dL    GFR Estimate >90 >60 mL/min/[1.73_m2]      Comment:      Non  GFR Calc  Starting 12/18/2018, serum creatinine based estimated GFR (eGFR) will be   calculated using the Chronic  Kidney Disease Epidemiology Collaboration   (CKD-EPI) equation.      GFR Estimate If Black >90 >60 mL/min/[1.73_m2]      Comment:       GFR Calc  Starting 12/18/2018, serum creatinine based estimated GFR (eGFR) will be   calculated using the Chronic Kidney Disease Epidemiology Collaboration   (CKD-EPI) equation.      Calcium 9.2 8.5 - 10.1 mg/dL    Bilirubin Total 0.7 0.2 - 1.3 mg/dL    Albumin 3.9 3.4 - 5.0 g/dL    Protein Total 7.9 6.8 - 8.8 g/dL    Alkaline Phosphatase 88 40 - 150 U/L    ALT 94 (H) 0 - 70 U/L    AST 64 (H) 0 - 45 U/L   Hemoglobin A1c   Result Value Ref Range    Hemoglobin A1C 7.2 (H) 0 - 5.6 %      Comment:      Normal <5.7% Prediabetes 5.7-6.4%  Diabetes 6.5% or higher - adopted from ADA   consensus guidelines.         If you have any questions or concerns, please call the clinic at the number listed above.       Sincerely,    Chris Zuluaga MD

## 2019-07-25 ENCOUNTER — ALLIED HEALTH/NURSE VISIT (OUTPATIENT)
Dept: NURSING | Facility: CLINIC | Age: 64
End: 2019-07-25
Payer: COMMERCIAL

## 2019-07-25 VITALS — SYSTOLIC BLOOD PRESSURE: 126 MMHG | DIASTOLIC BLOOD PRESSURE: 80 MMHG

## 2019-07-25 DIAGNOSIS — I10 HYPERTENSION GOAL BP (BLOOD PRESSURE) < 140/80: Primary | ICD-10-CM

## 2019-07-25 PROCEDURE — 99207 ZZC NO CHARGE NURSE ONLY: CPT

## 2019-07-25 NOTE — PROGRESS NOTES
Pancho Costello is a 63 year old patient who comes in today for a Blood Pressure check.    Patient had BP medication this morning.    Initial BP:  /80 (BP Location: Right arm, Patient Position: Chair, Cuff Size: Adult Large)      Data Unavailable  Disposition: results routed to provider  Lakesha Shannon MA

## 2019-08-02 DIAGNOSIS — E78.5 HYPERLIPIDEMIA LDL GOAL <100: ICD-10-CM

## 2019-08-05 DIAGNOSIS — I10 HYPERTENSION GOAL BP (BLOOD PRESSURE) < 140/80: ICD-10-CM

## 2019-08-05 RX ORDER — ATORVASTATIN CALCIUM 20 MG/1
TABLET, FILM COATED ORAL
Qty: 90 TABLET | Refills: 3 | Status: SHIPPED | OUTPATIENT
Start: 2019-08-05 | End: 2020-07-15

## 2019-08-05 RX ORDER — LISINOPRIL 20 MG/1
TABLET ORAL
Qty: 90 TABLET | Refills: 0 | Status: SHIPPED | OUTPATIENT
Start: 2019-08-05 | End: 2019-09-12

## 2019-08-05 NOTE — TELEPHONE ENCOUNTER
Prescription approved per [facility], [facility] or [facility] refill protocol.  Ewa CABELLO RN   [facility]

## 2019-08-05 NOTE — TELEPHONE ENCOUNTER
"Requested Prescriptions   Pending Prescriptions Disp Refills     atorvastatin (LIPITOR) 20 MG tablet [Pharmacy Med Name: ATORVASTATIN 20MG TABLETS] 30 tablet 0     Sig: TAKE 1 TABLET(20 MG) BY MOUTH DAILY       Statins Protocol Passed - 8/2/2019  9:58 PM        Passed - LDL on file in past 12 months     Recent Labs   Lab Test 07/02/19  0812   LDL 88             Passed - No abnormal creatine kinase in past 12 months     No lab results found.             Passed - Recent (12 mo) or future (30 days) visit within the authorizing provider's specialty     Patient had office visit in the last 12 months or has a visit in the next 30 days with authorizing provider or within the authorizing provider's specialty.  See \"Patient Info\" tab in inbasket, or \"Choose Columns\" in Meds & Orders section of the refill encounter.              Passed - Medication is active on med list        Passed - Patient is age 18 or older        atorvastatin (LIPITOR) 20 MG tablet 30 tablet 0 7/2/2019       Last Written Prescription Date:  7/2/2019  Last Fill Quantity: 30,  # refills: 0   Last office visit: 7/2/2019 with prescribing provider:  Dr. Zuluaga   Future Office Visit:  Unknown     "

## 2019-08-05 NOTE — TELEPHONE ENCOUNTER
"Requested Prescriptions   Pending Prescriptions Disp Refills     lisinopril (PRINIVIL/ZESTRIL) 20 MG tablet [Pharmacy Med Name: LISINOPRIL 20MG TABLETS] 90 tablet 0     Sig: TAKE 1 TABLET(20 MG) BY MOUTH DAILY       ACE Inhibitors (Including Combos) Protocol Passed - 8/5/2019  9:58 AM        Passed - Blood pressure under 140/90 in past 12 months     BP Readings from Last 3 Encounters:   07/25/19 126/80   07/02/19 150/80   02/06/19 140/80                 Passed - Recent (12 mo) or future (30 days) visit within the authorizing provider's specialty     Patient had office visit in the last 12 months or has a visit in the next 30 days with authorizing provider or within the authorizing provider's specialty.  See \"Patient Info\" tab in inbasket, or \"Choose Columns\" in Meds & Orders section of the refill encounter.              Passed - Medication is active on med list        Passed - Patient is age 18 or older        Passed - Normal serum creatinine on file in past 12 months     Recent Labs   Lab Test 07/02/19  0812   CR 0.81             Passed - Normal serum potassium on file in past 12 months     Recent Labs   Lab Test 07/02/19  0812   POTASSIUM 4.3             lisinopril (PRINIVIL/ZESTRIL) 20 MG tablet  Last Written Prescription Date:  7-2-19  Last Fill Quantity: 90,  # refills: 3   Last office visit: 7/2/2019 with prescribing provider:  NICHOLAS Zuluaga   Future Office Visit:      "

## 2019-09-11 DIAGNOSIS — E11.9 TYPE 2 DIABETES MELLITUS WITHOUT COMPLICATION, WITHOUT LONG-TERM CURRENT USE OF INSULIN (H): ICD-10-CM

## 2019-09-11 RX ORDER — GLIPIZIDE 2.5 MG/1
TABLET, EXTENDED RELEASE ORAL
Qty: 90 TABLET | Refills: 0 | OUTPATIENT
Start: 2019-09-11

## 2019-09-11 NOTE — TELEPHONE ENCOUNTER
"Requested Prescriptions   Pending Prescriptions Disp Refills     glipiZIDE (GLUCOTROL XL) 2.5 MG 24 hr tablet [Pharmacy Med Name: GLIPIZIDE ER 2.5MG TABLET] 90 tablet 0     Sig: TAKE 1 TABLET(2.5 MG) BY MOUTH DAILY  Last Written Prescription Date:  7/2/19  Last Fill Quantity: 90,  # refills: 1   Last office visit: 7/2/2019 with prescribing provider:  Margareth   Future Office Visit:           Sulfonylurea Agents Passed - 9/11/2019  1:47 PM        Passed - Blood pressure less than 140/90 in past 6 months     BP Readings from Last 3 Encounters:   07/25/19 126/80   07/02/19 150/80   02/06/19 140/80                 Passed - Patient has documented LDL within the past 12 mos.     Recent Labs   Lab Test 07/02/19  0812   LDL 88             Passed - Patient has had a Microalbumin in the past 15 mos.     Recent Labs   Lab Test 07/02/19  0813   MICROL 90   UMALCR 27.64*             Passed - Patient has documented A1c within the specified period of time.     If HgbA1C is 8 or greater, it needs to be on file within the past 3 months.  If less than 8, must be on file within the past 6 months.     Recent Labs   Lab Test 07/02/19  0812   A1C 7.2*             Passed - Medication is active on med list        Passed - Patient is age 18 or older        Passed - Patient has a recent creatinine (normal) within the past 12 mos.     Recent Labs   Lab Test 07/02/19  0812   CR 0.81             Passed - Recent (6 mo) or future (30 days) visit within the authorizing provider's specialty     Patient had office visit in the last 6 months or has a visit in the next 30 days with authorizing provider or within the authorizing provider's specialty.  See \"Patient Info\" tab in inbasket, or \"Choose Columns\" in Meds & Orders section of the refill encounter.              "

## 2019-09-11 NOTE — TELEPHONE ENCOUNTER
Denied RX, patient already given 6 month refill on 7/2/19 to the same pharmacy requesting.     Lisandra Hugo RN, BSN  [facility]

## 2019-09-12 ENCOUNTER — HOSPITAL ENCOUNTER (OUTPATIENT)
Facility: CLINIC | Age: 64
Setting detail: OBSERVATION
Discharge: HOME | End: 2019-09-13
Attending: EMERGENCY MEDICINE | Admitting: INTERNAL MEDICINE
Payer: COMMERCIAL

## 2019-09-12 ENCOUNTER — APPOINTMENT (OUTPATIENT)
Dept: CT IMAGING | Facility: CLINIC | Age: 64
End: 2019-09-12
Attending: EMERGENCY MEDICINE
Payer: COMMERCIAL

## 2019-09-12 DIAGNOSIS — K56.609 SBO (SMALL BOWEL OBSTRUCTION) (H): ICD-10-CM

## 2019-09-12 PROBLEM — R10.9 ABDOMINAL PAIN: Status: ACTIVE | Noted: 2019-09-12

## 2019-09-12 LAB
ALBUMIN SERPL-MCNC: 3.9 G/DL (ref 3.4–5)
ALBUMIN UR-MCNC: 30 MG/DL
ALP SERPL-CCNC: 86 U/L (ref 40–150)
ALT SERPL W P-5'-P-CCNC: 72 U/L (ref 0–70)
ANION GAP SERPL CALCULATED.3IONS-SCNC: 7 MMOL/L (ref 3–14)
APPEARANCE UR: CLEAR
AST SERPL W P-5'-P-CCNC: 35 U/L (ref 0–45)
BASOPHILS # BLD AUTO: 0 10E9/L (ref 0–0.2)
BASOPHILS NFR BLD AUTO: 0.1 %
BILIRUB SERPL-MCNC: 0.8 MG/DL (ref 0.2–1.3)
BILIRUB UR QL STRIP: NEGATIVE
BUN SERPL-MCNC: 16 MG/DL (ref 7–30)
CALCIUM SERPL-MCNC: 9.6 MG/DL (ref 8.5–10.1)
CHLORIDE SERPL-SCNC: 101 MMOL/L (ref 94–109)
CO2 SERPL-SCNC: 26 MMOL/L (ref 20–32)
COLOR UR AUTO: YELLOW
CREAT SERPL-MCNC: 0.76 MG/DL (ref 0.66–1.25)
DIFFERENTIAL METHOD BLD: ABNORMAL
EOSINOPHIL # BLD AUTO: 0 10E9/L (ref 0–0.7)
EOSINOPHIL NFR BLD AUTO: 0 %
ERYTHROCYTE [DISTWIDTH] IN BLOOD BY AUTOMATED COUNT: 12.7 % (ref 10–15)
GFR SERPL CREATININE-BSD FRML MDRD: >90 ML/MIN/{1.73_M2}
GLUCOSE BLDC GLUCOMTR-MCNC: 81 MG/DL (ref 70–99)
GLUCOSE BLDC GLUCOMTR-MCNC: 86 MG/DL (ref 70–99)
GLUCOSE SERPL-MCNC: 240 MG/DL (ref 70–99)
GLUCOSE UR STRIP-MCNC: 300 MG/DL
HCT VFR BLD AUTO: 45.7 % (ref 40–53)
HGB BLD-MCNC: 15.8 G/DL (ref 13.3–17.7)
HGB UR QL STRIP: NEGATIVE
IMM GRANULOCYTES # BLD: 0 10E9/L (ref 0–0.4)
IMM GRANULOCYTES NFR BLD: 0.2 %
INTERPRETATION ECG - MUSE: NORMAL
KETONES UR STRIP-MCNC: 10 MG/DL
LEUKOCYTE ESTERASE UR QL STRIP: NEGATIVE
LIPASE SERPL-CCNC: 80 U/L (ref 73–393)
LYMPHOCYTES # BLD AUTO: 0.7 10E9/L (ref 0.8–5.3)
LYMPHOCYTES NFR BLD AUTO: 5.6 %
MCH RBC QN AUTO: 33.8 PG (ref 26.5–33)
MCHC RBC AUTO-ENTMCNC: 34.6 G/DL (ref 31.5–36.5)
MCV RBC AUTO: 98 FL (ref 78–100)
MONOCYTES # BLD AUTO: 0.8 10E9/L (ref 0–1.3)
MONOCYTES NFR BLD AUTO: 6.1 %
MUCOUS THREADS #/AREA URNS LPF: PRESENT /LPF
NEUTROPHILS # BLD AUTO: 11.6 10E9/L (ref 1.6–8.3)
NEUTROPHILS NFR BLD AUTO: 88 %
NITRATE UR QL: NEGATIVE
NRBC # BLD AUTO: 0 10*3/UL
NRBC BLD AUTO-RTO: 0 /100
PH UR STRIP: 5.5 PH (ref 5–7)
PLATELET # BLD AUTO: 254 10E9/L (ref 150–450)
POTASSIUM SERPL-SCNC: 4.3 MMOL/L (ref 3.4–5.3)
PROT SERPL-MCNC: 7.9 G/DL (ref 6.8–8.8)
RBC # BLD AUTO: 4.68 10E12/L (ref 4.4–5.9)
RBC #/AREA URNS AUTO: 1 /HPF (ref 0–2)
SODIUM SERPL-SCNC: 134 MMOL/L (ref 133–144)
SOURCE: ABNORMAL
SP GR UR STRIP: 1.03 (ref 1–1.03)
SQUAMOUS #/AREA URNS AUTO: 1 /HPF (ref 0–1)
UROBILINOGEN UR STRIP-MCNC: 2 MG/DL (ref 0–2)
WBC # BLD AUTO: 13.1 10E9/L (ref 4–11)
WBC #/AREA URNS AUTO: 1 /HPF (ref 0–5)

## 2019-09-12 PROCEDURE — 25800030 ZZH RX IP 258 OP 636: Performed by: HOSPITALIST

## 2019-09-12 PROCEDURE — 99204 OFFICE O/P NEW MOD 45 MIN: CPT | Performed by: SURGERY

## 2019-09-12 PROCEDURE — 12000000 ZZH R&B MED SURG/OB

## 2019-09-12 PROCEDURE — 93005 ELECTROCARDIOGRAM TRACING: CPT

## 2019-09-12 PROCEDURE — 99285 EMERGENCY DEPT VISIT HI MDM: CPT | Mod: 25

## 2019-09-12 PROCEDURE — 74177 CT ABD & PELVIS W/CONTRAST: CPT

## 2019-09-12 PROCEDURE — 99223 1ST HOSP IP/OBS HIGH 75: CPT | Mod: AI | Performed by: HOSPITALIST

## 2019-09-12 PROCEDURE — 00000146 ZZHCL STATISTIC GLUCOSE BY METER IP

## 2019-09-12 PROCEDURE — 25000128 H RX IP 250 OP 636: Performed by: EMERGENCY MEDICINE

## 2019-09-12 PROCEDURE — 96375 TX/PRO/DX INJ NEW DRUG ADDON: CPT

## 2019-09-12 PROCEDURE — 81001 URINALYSIS AUTO W/SCOPE: CPT | Performed by: EMERGENCY MEDICINE

## 2019-09-12 PROCEDURE — 80053 COMPREHEN METABOLIC PANEL: CPT | Performed by: EMERGENCY MEDICINE

## 2019-09-12 PROCEDURE — 25000128 H RX IP 250 OP 636: Performed by: HOSPITALIST

## 2019-09-12 PROCEDURE — 25000125 ZZHC RX 250: Performed by: HOSPITALIST

## 2019-09-12 PROCEDURE — 96374 THER/PROPH/DIAG INJ IV PUSH: CPT

## 2019-09-12 PROCEDURE — 83690 ASSAY OF LIPASE: CPT | Performed by: EMERGENCY MEDICINE

## 2019-09-12 PROCEDURE — 25000125 ZZHC RX 250: Performed by: EMERGENCY MEDICINE

## 2019-09-12 PROCEDURE — 85025 COMPLETE CBC W/AUTO DIFF WBC: CPT | Performed by: EMERGENCY MEDICINE

## 2019-09-12 RX ORDER — DEXTROSE MONOHYDRATE 25 G/50ML
25-50 INJECTION, SOLUTION INTRAVENOUS
Status: DISCONTINUED | OUTPATIENT
Start: 2019-09-12 | End: 2019-09-13 | Stop reason: HOSPADM

## 2019-09-12 RX ORDER — ATORVASTATIN CALCIUM 20 MG/1
20 TABLET, FILM COATED ORAL
COMMUNITY
Start: 2018-06-14 | End: 2019-09-12

## 2019-09-12 RX ORDER — ACETAMINOPHEN 325 MG/1
650 TABLET ORAL EVERY 4 HOURS PRN
Status: DISCONTINUED | OUTPATIENT
Start: 2019-09-12 | End: 2019-09-13 | Stop reason: HOSPADM

## 2019-09-12 RX ORDER — NALOXONE HYDROCHLORIDE 0.4 MG/ML
.1-.4 INJECTION, SOLUTION INTRAMUSCULAR; INTRAVENOUS; SUBCUTANEOUS
Status: DISCONTINUED | OUTPATIENT
Start: 2019-09-12 | End: 2019-09-13 | Stop reason: HOSPADM

## 2019-09-12 RX ORDER — BISACODYL 10 MG
10 SUPPOSITORY, RECTAL RECTAL DAILY PRN
Status: DISCONTINUED | OUTPATIENT
Start: 2019-09-12 | End: 2019-09-13 | Stop reason: HOSPADM

## 2019-09-12 RX ORDER — POTASSIUM CHLORIDE 29.8 MG/ML
20 INJECTION INTRAVENOUS
Status: DISCONTINUED | OUTPATIENT
Start: 2019-09-12 | End: 2019-09-13 | Stop reason: HOSPADM

## 2019-09-12 RX ORDER — POTASSIUM CHLORIDE 1500 MG/1
20-40 TABLET, EXTENDED RELEASE ORAL
Status: DISCONTINUED | OUTPATIENT
Start: 2019-09-12 | End: 2019-09-13 | Stop reason: HOSPADM

## 2019-09-12 RX ORDER — HYDROCHLOROTHIAZIDE 25 MG/1
1 TABLET ORAL EVERY 24 HOURS
COMMUNITY
Start: 2009-08-21 | End: 2019-09-24

## 2019-09-12 RX ORDER — NICOTINE POLACRILEX 4 MG
15-30 LOZENGE BUCCAL
Status: DISCONTINUED | OUTPATIENT
Start: 2019-09-12 | End: 2019-09-13 | Stop reason: HOSPADM

## 2019-09-12 RX ORDER — ONDANSETRON 2 MG/ML
4 INJECTION INTRAMUSCULAR; INTRAVENOUS EVERY 6 HOURS PRN
Status: DISCONTINUED | OUTPATIENT
Start: 2019-09-12 | End: 2019-09-13 | Stop reason: HOSPADM

## 2019-09-12 RX ORDER — ONDANSETRON 2 MG/ML
4 INJECTION INTRAMUSCULAR; INTRAVENOUS ONCE
Status: COMPLETED | OUTPATIENT
Start: 2019-09-12 | End: 2019-09-12

## 2019-09-12 RX ORDER — POTASSIUM CHLORIDE 1.5 G/1.58G
20-40 POWDER, FOR SOLUTION ORAL
Status: DISCONTINUED | OUTPATIENT
Start: 2019-09-12 | End: 2019-09-13 | Stop reason: HOSPADM

## 2019-09-12 RX ORDER — MORPHINE SULFATE 4 MG/ML
4 INJECTION, SOLUTION INTRAMUSCULAR; INTRAVENOUS ONCE
Status: COMPLETED | OUTPATIENT
Start: 2019-09-12 | End: 2019-09-12

## 2019-09-12 RX ORDER — SIMVASTATIN 20 MG
1 TABLET ORAL
COMMUNITY
Start: 2009-08-21 | End: 2019-09-12

## 2019-09-12 RX ORDER — ONDANSETRON 4 MG/1
4 TABLET, ORALLY DISINTEGRATING ORAL EVERY 6 HOURS PRN
Status: DISCONTINUED | OUTPATIENT
Start: 2019-09-12 | End: 2019-09-13 | Stop reason: HOSPADM

## 2019-09-12 RX ORDER — LIDOCAINE 40 MG/G
CREAM TOPICAL
Status: DISCONTINUED | OUTPATIENT
Start: 2019-09-12 | End: 2019-09-13 | Stop reason: HOSPADM

## 2019-09-12 RX ORDER — POTASSIUM CL/LIDO/0.9 % NACL 10MEQ/0.1L
10 INTRAVENOUS SOLUTION, PIGGYBACK (ML) INTRAVENOUS
Status: DISCONTINUED | OUTPATIENT
Start: 2019-09-12 | End: 2019-09-13 | Stop reason: HOSPADM

## 2019-09-12 RX ORDER — HYDROMORPHONE HYDROCHLORIDE 1 MG/ML
0.2 INJECTION, SOLUTION INTRAMUSCULAR; INTRAVENOUS; SUBCUTANEOUS
Status: DISCONTINUED | OUTPATIENT
Start: 2019-09-12 | End: 2019-09-13 | Stop reason: HOSPADM

## 2019-09-12 RX ORDER — SODIUM CHLORIDE 9 MG/ML
INJECTION, SOLUTION INTRAVENOUS CONTINUOUS
Status: DISCONTINUED | OUTPATIENT
Start: 2019-09-12 | End: 2019-09-13 | Stop reason: HOSPADM

## 2019-09-12 RX ORDER — IOPAMIDOL 755 MG/ML
500 INJECTION, SOLUTION INTRAVASCULAR ONCE
Status: COMPLETED | OUTPATIENT
Start: 2019-09-12 | End: 2019-09-12

## 2019-09-12 RX ORDER — POTASSIUM CHLORIDE 7.45 MG/ML
10 INJECTION INTRAVENOUS
Status: DISCONTINUED | OUTPATIENT
Start: 2019-09-12 | End: 2019-09-13 | Stop reason: HOSPADM

## 2019-09-12 RX ADMIN — MORPHINE SULFATE 4 MG: 4 INJECTION INTRAVENOUS at 11:52

## 2019-09-12 RX ADMIN — IOPAMIDOL 131 ML: 755 INJECTION, SOLUTION INTRAVENOUS at 13:01

## 2019-09-12 RX ADMIN — HYDROMORPHONE HYDROCHLORIDE 0.2 MG: 1 INJECTION, SOLUTION INTRAMUSCULAR; INTRAVENOUS; SUBCUTANEOUS at 17:48

## 2019-09-12 RX ADMIN — HYDROMORPHONE HYDROCHLORIDE 0.2 MG: 1 INJECTION, SOLUTION INTRAMUSCULAR; INTRAVENOUS; SUBCUTANEOUS at 23:54

## 2019-09-12 RX ADMIN — ONDANSETRON 4 MG: 2 INJECTION INTRAMUSCULAR; INTRAVENOUS at 11:52

## 2019-09-12 RX ADMIN — SODIUM CHLORIDE 80 ML: 9 INJECTION, SOLUTION INTRAVENOUS at 13:02

## 2019-09-12 RX ADMIN — SODIUM CHLORIDE: 9 INJECTION, SOLUTION INTRAVENOUS at 16:51

## 2019-09-12 RX ADMIN — FAMOTIDINE 20 MG: 10 INJECTION, SOLUTION INTRAVENOUS at 17:42

## 2019-09-12 ASSESSMENT — ENCOUNTER SYMPTOMS
VOMITING: 1
HEADACHES: 0
FEVER: 0
DIARRHEA: 0
COUGH: 0
ABDOMINAL PAIN: 1
FATIGUE: 1
DIFFICULTY URINATING: 0
NAUSEA: 0

## 2019-09-12 ASSESSMENT — MIFFLIN-ST. JEOR: SCORE: 2033.23

## 2019-09-12 ASSESSMENT — ACTIVITIES OF DAILY LIVING (ADL): ADLS_ACUITY_SCORE: 17

## 2019-09-12 NOTE — PHARMACY-ADMISSION MEDICATION HISTORY
Admission medication history interview status for the 9/12/2019  admission is complete. See EPIC admission navigator for prior to admission medications     Medication history source reliability:Good    Actions taken by pharmacist (provider contacted, etc): Interviewed patient. Verified medications with [facility] pharmacy and Medication dispense history report     Additional medication history information not noted on PTA med list :None    Medication reconciliation/reorder completed by provider prior to medication history? No    Time spent in this activity: 20 mins    Prior to Admission medications    Medication Sig Last Dose Taking? Auth Provider   aspirin 81 MG tablet Take 1 tablet (81 mg) by mouth daily 9/11/2019 at pm Yes Chris Zuluaga MD   atorvastatin (LIPITOR) 20 MG tablet TAKE 1 TABLET(20 MG) BY MOUTH DAILY 9/11/2019 at pm Yes Chris Zuluaga MD   glipiZIDE (GLUCOTROL XL) 5 MG 24 hr tablet Take 1 tablet (5 mg) by mouth daily 9/12/2019 at am Yes Chris Zuluaga MD   hydrochlorothiazide (HYDRODIURIL) 25 MG tablet Take 1 tablet by mouth every 24 hours  Yes Reported, Patient   lisinopril (PRINIVIL/ZESTRIL) 20 MG tablet TAKE 1 TABLET(20 MG) BY MOUTH DAILY 9/12/2019 at am Yes Chris Zuluaga MD   metFORMIN (GLUCOPHAGE) 1000 MG tablet TAKE 1 TABLET BY MOUTH TWICE DAILY WITH MEALS 9/12/2019 at am Yes Chris Zuluaga MD   sildenafil (VIAGRA) 50 MG tablet Take 1 tablet (50 mg) by mouth daily as needed (as needed) 30 min to 4 hrs before sex. Do not use with nitroglycerin, terazosin or doxazosin. prn Yes Chris Zuluaga MD   blood glucose (ACCU-CHEK SMARTVIEW) test strip Testing 1-2 times per week   Chris Zuluaga MD   blood glucose monitoring (ACCU-CHEK FASTCLIX) lancets Test 1 times a day   Chris Zuluaga MD

## 2019-09-12 NOTE — CONSULTS
"[facility]  General Surgery Consultation         Amado Hall MD, MD    Pancho Costello MRN# [id number]   YOB: 1955 Age: 63 year old      Date of Admission:  9/12/2019  Date of Consult: 9/12/2019         Assessment and Plan:   Pleasant 63-year-old gentleman with long-standing epigastric hernia now presents with symptomatic hernia and resultant small bowel obstruction.  Hernia was able to be reduced in the emergency department patient experienced fairly quick relief.  No nausea currently but has been suffering from constipation.  Agree with admission to the hospital for observation.  May have food tonight but should be n.p.o. after midnight.  If he is relatively symptom-free tomorrow, I expect he could be discharged with plans for elective ventral hernia repair in the future.  If his symptoms persist or worsen, we may need to consider more urgent surgery performed during this hospitalization.  I explained the use of mesh in hernia repairs.  Surgical co-morbities include obesity, hypertension, type 2 diabetes.            Code Status:   Full Code         Primary Care Physician:   Chris Zuluaga [phone]         Requesting Physician:      Betsy         Chief Complaint:   Abdominal pain    History is obtained from the patient         History of Present Illness:     Pancho Costello is a 63 year old male who presents with abdominal pain. Patient was referred to the ER today after being seen at urgent care yesterday with concerns for possible obstruction secondary to a hernia. Last night, he states it felt like his \"stomach was blowing up\" and reports his pain today is improved compared to last night. He adds his pain was mildly improved with ambulation and states his pain is severely exacerbates while supine. He details his stomach has been \"growling\" for a couple weeks and acknowledges he started vomiting last night. Currently, he attest to fatigue. He denies any nausea, headache, " chest pain, rash, cough, shortness of breath, diarrhea, difficulty urinating, fever, history of similar pain, prior abdominal surgeries, or any analgesics prior to arrival. Prior colonoscopy was normal ten years prior.            Past Medical History:   Pancho Costello  has a past medical history of Hypertension and Type 2 diabetes mellitus without complications (H) (10/14/2015).         Past Surgical History:   Pancho Costello  has a past surgical history that includes LIGATN/STRIP LONG & SHORT SAPHEN.         Home Medications:     Prior to Admission medications    Medication Sig Last Dose Taking? Auth Provider   aspirin 81 MG tablet Take 1 tablet (81 mg) by mouth daily 9/11/2019 at pm Yes Chris Zuluaga MD   atorvastatin (LIPITOR) 20 MG tablet TAKE 1 TABLET(20 MG) BY MOUTH DAILY 9/11/2019 at pm Yes Chris Zuluaga MD   glipiZIDE (GLUCOTROL XL) 5 MG 24 hr tablet Take 1 tablet (5 mg) by mouth daily 9/12/2019 at am Yes Chris Zuluaga MD   hydrochlorothiazide (HYDRODIURIL) 25 MG tablet Take 1 tablet by mouth every 24 hours  Yes Reported, Patient   lisinopril (PRINIVIL/ZESTRIL) 20 MG tablet TAKE 1 TABLET(20 MG) BY MOUTH DAILY 9/12/2019 at am Yes Chris Zuluaga MD   metFORMIN (GLUCOPHAGE) 1000 MG tablet TAKE 1 TABLET BY MOUTH TWICE DAILY WITH MEALS 9/12/2019 at am Yes Chris Zuluaga MD   sildenafil (VIAGRA) 50 MG tablet Take 1 tablet (50 mg) by mouth daily as needed (as needed) 30 min to 4 hrs before sex. Do not use with nitroglycerin, terazosin or doxazosin. prn Yes Chris Zuluaga MD   blood glucose (ACCU-CHEK SMARTVIEW) test strip Testing 1-2 times per week   Chris Zuluaga MD   blood glucose monitoring (ACCU-CHEK FASTCLIX) lancets Test 1 times a day   Chris Zuluaga MD            Current Medications:           famotidine  20 mg Intravenous Q12H     insulin aspart  1-6 Units Subcutaneous Q4H     sodium chloride (PF)  3 mL Intracatheter Q8H     acetaminophen, bisacodyl, glucose **OR** dextrose **OR** glucagon, HYDROmorphone, lidocaine 4%,  "lidocaine (buffered or not buffered), melatonin, naloxone, ondansetron **OR** ondansetron, potassium chloride, potassium chloride with lidocaine, potassium chloride, potassium chloride, potassium chloride, sodium chloride (PF)         Allergies:   No Known Allergies         Social History:   Pancho Costello  reports that he has never smoked. He has never used smokeless tobacco. He reports that he drinks alcohol. He reports that he does not use drugs.          Family History:   Pancho Costello family history includes Breast Cancer in his mother; Diabetes in his mother; Heart Failure in his mother.          Review of Systems:   The 10 point Review of Systems is negative other than noted in the HPI.  No fevers or chills.  Nausea with emesis as recently as yesterday.  Has been constipated.           Physical Exam:   Blood pressure 138/74, pulse 97, temperature 97.9  F (36.6  C), temperature source Oral, resp. rate 14, height 1.854 m (6' 1\"), weight 117.9 kg (260 lb), SpO2 94 %.  260 lbs 0 oz    Healthy-appearing overweight gentleman in no distress.  Patient has a pleasant affect and communicates well.   Pupils equal round and reactive to light.   No cervical lymphadenopathy or thyromegaly.   Lung fields clear, breathing comfortably.   Heart normal sinus rhythm.  No murmurs rubs or gallops.  Abdomen soft, nontender, nondistended.  Bilobed epigastric hernia several centimeters above the umbilicus.  More tender on the right but both defects are reducible.  No generalized pain.  No surrounding surgical scars.  Skin warm, dry.  No obvious rashes or lesions.           Data:   All new lab and imaging data was reviewed.  This includes review of labs, clinic notes, and personal review of CT scan images.  Recent Labs   Lab Test 09/12/19  1141   WBC 13.1*   HGB 15.8   MCV 98         Recent Labs   Lab Test 09/12/19  1141 07/02/19  0812    135   POTASSIUM 4.3 4.3   CHLORIDE 101 102   CO2 26 25   BUN 16 10   CR 0.76 0.81 "   ANIONGAP 7 8   YONAS 9.6 9.2   * 169*

## 2019-09-12 NOTE — DISCHARGE SUMMARY
RECEIVING UNIT ED HANDOFF REVIEW    ED Nurse Handoff Report was reviewed by: Vero Owusu RN on September 12, 2019 at 4:25 PM

## 2019-09-12 NOTE — H&P
[facility]    History and Physical  Hospitalist    Pancho Costello MRN# [id number]   Age: 63 year old YOB: 1955     Date of Admission:  9/12/2019    Primary care provider: Chris Zuluaga          Assessment and Plan:     Pancho Costello is a 63 year old  male with medical history of hypertension, hyperlipidemia, diabetes mellitus presented to ED with abdominal pain.    Acute abdominal pain likely from ventral hernia, early mechanical small bowel obstruction.  Patient states has ongoing abdominal pain since yesterday, felt like his stomach was blowing up.  Pain improved with ambulation, worse on lying down in supine position.  Constipated for last 2 days, ate Dana lexi (edamames) last night.  In the ED had abdominal tenderness, large supraumbilical hernia.  WBC 30.1, glucose 240, ALT 72, hemoglobin 15.8.  Lipase 80. Urine analysis with nitrite negative, leukocyte esterase negative, 1 WBC.  EKG normal sinus rhythm, heart rate 99, QTc 444.  CT Abdomen Pelvis w Contrast There are two adjacent ventral hernias above the umbilicus, one of which contains a loop of small bowel and causes an early mechanical small bowel obstruction.  Admit to inpatient.  IV fluid with normal saline at 100 mL/h.  N.p.o.  General surgery eval requested, discussed with surgery team.  IV famotidine twice daily for GI prophylaxis.  Tylenol for mild pain, IV Dilaudid for moderate to severe pain.   IV/P.o. Zofran as needed.    Diabetes mellitus hemoglobin A1c of 7.2.  Hold PTA metformin and glipizide.  We will start on insulin sliding scale medium intensity every 4 hours while n.p.o.  Monitor blood sugars closely.    Hyperlipidemia.  Hold PTA Lipitor with acute illness.  Hold PTA aspirin pending surgical evaluation.    Hypertension.  Hold PTA hydrochlorothiazide.  Hold PTA lisinopril while n.p.o. pending surgical eval.  PRN IV hydralazine and metoprolol ordered.    Moderate alcohol use.  Drinks 2 glasses of wine every  day.  will need to consider cut down alcohol use.    Fatty liver likely from obesity, alcohol use.  CT scan shows Fatty infiltration of the liver and trace ascites.  Age-appropriate health maintenance as outpatient including colonoscopy.    Obesity with a BMI of 34.30.  Consider lifestyle modification    DVT Prophylaxis: Pneumatic Compression Devices  Code Status: Full Code    Disposition: Expected discharge in clinical improvement.  Patient, daughter and wife interdisciplinary team involved in care and agrees with plan.    Total time  > 40 minutes. Discussed with patient and ED team, general surgery.    Rebecca Meyer MD          Chief Complaint:     History taken from patient, family by the bedside    Pancho Costello is a 63 year old  male with medical history of hypertension, hyperlipidemia, diabetes mellitus presented to ED with abdominal pain.    Patient states has ongoing abdominal pain since yesterday, felt like his stomach was blowing up.  Pain improved with ambulation, worse on lying down in supine position.  Feels that his stomach was growling for a few weeks now.  Had one episode of vomiting last night.  No nausea.  No diarrhea.  Constipated for last 2 days, ate Dana lexi (edamames) last night.  No chest pain no shortness of breath.  No fever or chills.  No headache or dizziness.  No history of prior abdominal surgeries.  History of similar complaints in the past.  Prior colonoscop more than 10 years ago and was normal.    In the ED had abdominal tenderness, large supraumbilical hernia.  WBC 30.1, glucose 240, ALT 72, hemoglobin 15.8.  Lipase 80.  Urine analysis with nitrite negative, leukocyte esterase negative, 1 WBC.  CT Abdomen Pelvis w Contrast There are two adjacent ventral hernias above the umbilicus, one of which contains a loop of small bowel and causes an early mechanical small bowel obstruction.          Review of Systems:     GENERAL:  no fever or chills  EENT: no sinus problems, no difficulty  swallowing, no hearing difficulty  PULMONARY: No shortness of breath, no cough, no wheezing  CARDIAC: no chest pain, no irregular or fast heart beats   GI: No black or bloody stools  : No burning/pain with urination, no urgency  NEURO: no slurred speech, no seizures, no dizziness  ENDOCRINE: no excessive bruising.  MUSCULOSKELETAL: No joint pain   SKIN: No skin rashes  PSYCHIATRY no anxiety     Medical History:     Past Medical History:   Diagnosis Date     Hypertension      Type 2 diabetes mellitus without complications (H) 10/14/2015        Surgical History:      Past Surgical History:   Procedure Laterality Date     LIGATN/STRIP LONG & SHORT SAPHEN      2000             Social History:      Social History     Tobacco Use     Smoking status: Never Smoker     Smokeless tobacco: Never Used   Substance Use Topics     Alcohol use: Yes     Alcohol/week: 0.0 oz             Family History:     Family History   Problem Relation Age of Onset     Heart Failure Mother      Breast Cancer Mother      Diabetes Mother              Allergies:   No Known Allergies          Medications:   Home meds reviewed          Physical Exam      Admission Weight: 117.9 kg (260 lb)  Current Weight: 117.9 kg (260 lb)    Vital Signs with Ranges  Temp:  [98.2  F (36.8  C)] 98.2  F (36.8  C)  Pulse:  [] 100  Resp:  [14] 14  BP: (115-147)/(70-99) 115/70  SpO2:  [92 %-96 %] 92 %    PHYSICAL EXAM  GENERAL: Patient is in no distress. Alert and oriented.  HEENT: Oropharynx pink, moist. Pupils equal  HEART: Regular rate and rhythm. S1S2. No murmurs  LUNGS: Clear to auscultation bilaterally. No expiratory wheeze.  Respirations unlabored  ABDOMEN: Distended, large supraumbilical hernia nontender.  Left lower quadrant palpable tenderness.  Bowel sounds heard.   NEURO: Moving all extremities, no focal weakness.  EXTREMITIES: No pedal edema. 2+ peripheral pulses.  SKIN: Warm, dry. No rash or bruising.  PSYCHIATRY Cooperative         Data:   All new  lab and imaging data was reviewed.

## 2019-09-12 NOTE — ED NOTES
"[facility]  ED Nurse Handoff Report    ED Chief complaint: Abdominal Pain      ED Diagnosis:   Final diagnoses:   SBO (small bowel obstruction) (H)       Code Status: Full Code    Allergies: No Known Allergies    Activity level - Baseline/Home:  Independent  Activity Level - Current:   Independent    Patient's Preferred language: english   Needed?: No    Isolation: No  Infection: Not Applicable  Bariatric?: No    Vital Signs:   Vitals:    09/12/19 1117 09/12/19 1122 09/12/19 1200   BP: (!) 147/99  (!) 145/84   Pulse: (!) 16 119 101   Resp: 14     Temp: 98.2  F (36.8  C)     TempSrc: Oral     SpO2: 96%  93%   Weight: 117.9 kg (260 lb)     Height: 1.854 m (6' 1\")         Cardiac Rhythm: ,        Pain level: 0-10 Pain Scale: 4    Is this patient confused?: No   Does this patient have a guardian?  No         If yes, is there guardianship documents in the Epic \"Code/ACP\" activity?  N/A         Guardian Notified?  N/A  [place] - Suicide Severity Rating Scale Completed?  Yes  If yes, what color did the patient score?  White    Patient Report: Pancho comes to the ER today for abdominal pain that started last night. He received morphine and zofran and is feeling better. His abdominal ct shows a hernia with a bowel obstruction. He had some vomiting this morning but he has kept the water from his ct prep down. VSS. Await surgical consult.    Family Comments: none here    OBS brochure/video discussed/provided to patient/family: N/A              Name of person given brochure if not patient: n/a              Relationship to patient: n/a    ED Medications:   Medications   morphine (PF) injection 4 mg (4 mg Intravenous Given 9/12/19 1152)   ondansetron (ZOFRAN) injection 4 mg (4 mg Intravenous Given 9/12/19 1152)   iopamidol (ISOVUE-370) solution 500 mL (131 mLs Intravenous Given 9/12/19 1301)   sodium chloride 0.9 % bag 100mL for CT scan flush use (80 mLs Intravenous Given 9/12/19 1302)       Drips " infusing?:  No    For the majority of the shift this patient was Green.   Interventions performed were n/a.    Severe Sepsis OR Septic Shock Diagnosis Present: No    To be done/followed up on inpatient unit:  pain control and npo    ED NURSE PHONE NUMBER: [phone]

## 2019-09-12 NOTE — ED PROVIDER NOTES
"  History     Chief Complaint:  Abdominal Pain    The history is provided by the patient.      Pancho Costello is a 63 year old male who presents with abdominal pain. Patient was referred to the ER today after being seen at urgent care yesterday with concerns for possible obstruction secondary to a hernia. Last night, he states it felt like his \"stomach was blowing up\" and reports his pain today is improved compared to last night. He adds his pain was mildly improved with ambulation and states his pain is severely exacerbates while supine. He details his stomach has been \"growling\" for a couple weeks and acknowledges he started vomiting last night. Currently, he attest to fatigue. He denies any nausea, headache, chest pain, rash, cough, shortness of breath, diarrhea, difficulty urinating, fever, history of similar pain, prior abdominal surgeries, or any analgesics prior to arrival. Prior colonoscopy was normal ten years prior.     Allergies:  No Known Drug Allergies    Medications:    Baby Asprin  Lipitor  Hydrodiuril  Zocor  Lisinopril  Metformin  Viagra    Past Medical History:    Hypertension  Type II DM  Hyperlipidemia  Morbid obesity    Past Surgical History:    Ligation/strip long&short saphen    Family History:    CHF  Breast cancer  DM    Social History:  Employment: [employer] management  Never Smoker  Alcohol Use: Positive (glass of wine daily)  Marital Status:      Review of Systems   Constitutional: Positive for fatigue. Negative for fever.   Respiratory: Negative for cough.    Cardiovascular: Negative for chest pain.   Gastrointestinal: Positive for abdominal pain and vomiting. Negative for diarrhea and nausea.   Genitourinary: Negative for difficulty urinating.   Skin: Negative for rash.   Neurological: Negative for headaches.   All other systems reviewed and are negative.    Physical Exam     Patient Vitals for the past 24 hrs:   BP Temp Temp src Pulse Resp SpO2 Height Weight   09/12/19 1250 115/70 " "-- -- -- 14 92 % -- --   09/12/19 1230 134/71 -- -- 100 14 93 % -- --   09/12/19 1200 (!) 145/84 -- -- 101 -- 93 % -- --   09/12/19 1122 -- -- -- 119 -- -- -- --   09/12/19 1117 (!) 147/99 98.2  F (36.8  C) Oral (!) 16 14 96 % 1.854 m (6' 1\") 117.9 kg (260 lb)     Physical Exam  Constitutional: Heavy-set white male supine  HENT: No signs of trauma.   Eyes: EOM are normal. Pupils are equal, round, and reactive to light.   Neck: Normal range of motion. No JVD present. No cervical adenopathy.  Cardiovascular: Regular rhythm.  Exam reveals no gallop and no friction rub.    No murmur heard.  Pulmonary/Chest: Bilateral breath sounds normal. No wheezes, rhonchi or rales.  Abdominal: Protuberant abdomen, large supraumbilical hernia, non-tender easily reduces. 2+ femoral pulses.   Musculoskeletal: No edema. No tenderness.   Lymphadenopathy: No lymphadenopathy.   Neurological: Alert and oriented to person, place, and time. Normal strength. Coordination normal.   Skin: Skin is warm and dry. No rash noted. No erythema.     Emergency Department Course   ECG:  Normal sinus rhythm  Rate 99 bpm. ME interval 152 ms. QRS duration 80 ms. QT/QTc 346/444 ms. P-R-T axes 37 82 53.    Imaging:  CT Abdomen Pelvis w Contrast  1. There are two adjacent ventral hernias above the umbilicus, one of which contains a loop of small bowel and causes an early mechanical small bowel obstruction.  2. Fatty infiltration of the liver.  3. Trace ascites.  JUANA SOLORIO MD  Reading per radiology    Laboratory:  CBC: WBC 13.1, HGB 15.8,   CMP: glc 240 ALT 72 o/w WNL (Creatinine 0.76)  Lipase: 80  UA: glc 300 ketones 10 protein 30 mucous present o/w WNL     Interventions:  1152: Morphine 4 mg IV  1152: Zofran 4 mg IV    Emergency Department Course:  Nursing notes and vitals reviewed.  1119 I performed an exam of the patient as documented above.   1141 IV was inserted and blood was drawn for laboratory testing, results above.  1153 The patient " provided a urine sample here in the emergency department. This was sent for laboratory testing, findings above.  1307 The patient was sent for a CT while in the emergency department, results above.   1311 EKG obtained in the ED, see results above.   1330 Patient reassessed and updated on work-up thus far.  1521 Discussed the patient with Dr. Meyer, hospitalist, who will admit the patient for further monitoring, evaluation, and treatment   1543 Discussed the patient with Dr. Hall, general surgery, regarding the patient.    I personally reviewed the imaging and laboratory results with the patient and answered all related questions prior to admission.      Impression & Plan      Medical Decision Making:  This is a 63 year old male who presents to the ED with abdominal pain. This began yesterday, he went to urgent care and had a plain film done. There was concern that his abdominal hernia may be causing this and he was told to come here to get CT which he has done today. During the night, he vomited a lot and the pain is better. On exam, he has a very protuberant abdomen, there is a supraumbilical hernia which is easily reducible. There is a little bit of swelling and tenderness just to the left of this which could possibly be a second hernia which I cannot reduce. CT was obtained which showed bowel obstruction secondary to herniation of a loop of bowel. Patient is not in septic shock and does not seem to have ischemia at this time. He has received pain and nausea medicine and will be admitted for definitve treatment.     Diagnosis:    ICD-10-CM   1. SBO (small bowel obstruction) (H) K56.609     Disposition: Admit  Scribe Disclosure:  Cj BROOKS, am serving as a scribe at 11:18 AM on 9/12/2019 to document services personally performed by Andrea Alicea MD based on my observations and the provider's statements to me  [facility]PART[facility]       Andrea Alicea MD  09/12/19 1666

## 2019-09-13 VITALS
HEIGHT: 73 IN | WEIGHT: 260 LBS | SYSTOLIC BLOOD PRESSURE: 125 MMHG | DIASTOLIC BLOOD PRESSURE: 70 MMHG | HEART RATE: 97 BPM | RESPIRATION RATE: 18 BRPM | OXYGEN SATURATION: 92 % | TEMPERATURE: 98.8 F | BODY MASS INDEX: 34.46 KG/M2

## 2019-09-13 LAB
GLUCOSE BLDC GLUCOMTR-MCNC: 111 MG/DL (ref 70–99)
GLUCOSE BLDC GLUCOMTR-MCNC: 88 MG/DL (ref 70–99)
GLUCOSE BLDC GLUCOMTR-MCNC: 89 MG/DL (ref 70–99)

## 2019-09-13 PROCEDURE — 99217 ZZC OBSERVATION CARE DISCHARGE: CPT | Performed by: INTERNAL MEDICINE

## 2019-09-13 PROCEDURE — 25800030 ZZH RX IP 258 OP 636: Performed by: HOSPITALIST

## 2019-09-13 PROCEDURE — G0378 HOSPITAL OBSERVATION PER HR: HCPCS

## 2019-09-13 PROCEDURE — 25000125 ZZHC RX 250: Performed by: HOSPITALIST

## 2019-09-13 PROCEDURE — 00000146 ZZHCL STATISTIC GLUCOSE BY METER IP

## 2019-09-13 PROCEDURE — 25000128 H RX IP 250 OP 636: Performed by: HOSPITALIST

## 2019-09-13 RX ADMIN — HYDROMORPHONE HYDROCHLORIDE 0.2 MG: 1 INJECTION, SOLUTION INTRAMUSCULAR; INTRAVENOUS; SUBCUTANEOUS at 09:01

## 2019-09-13 RX ADMIN — FAMOTIDINE 20 MG: 10 INJECTION, SOLUTION INTRAVENOUS at 05:53

## 2019-09-13 RX ADMIN — SODIUM CHLORIDE: 9 INJECTION, SOLUTION INTRAVENOUS at 02:05

## 2019-09-13 RX ADMIN — HYDROMORPHONE HYDROCHLORIDE 0.2 MG: 1 INJECTION, SOLUTION INTRAMUSCULAR; INTRAVENOUS; SUBCUTANEOUS at 04:35

## 2019-09-13 ASSESSMENT — ACTIVITIES OF DAILY LIVING (ADL)
ADLS_ACUITY_SCORE: 12

## 2019-09-13 NOTE — PLAN OF CARE
9/12, 2300 - 9/13 0700: A&Ox4.  VSS, RA.  PRN Dilaudid given for abdominal pain. Abdomen is distended, patient states he is passing flatus, hypoactive BS, no BM reported. NPO until surgery consult done today (9/13).  PIV infusing NS at 100 mL/hr.  Independent, steady gait observed.  Q4hr BG checks, within normal limits, no coverage needed this shift.

## 2019-09-13 NOTE — PLAN OF CARE
A/O x4. Ambulating halls independently. Abd pain 4/10- declined intervention. Abd distended/firm. Passing gas. Hypo bowel sounds. NPO pending surgery consult. NS infusing. HS glucose 81.

## 2019-09-13 NOTE — PLAN OF CARE
Pt vss on ra. A&Ox4. Denies pain. Denies nausea. Tolerating low fiber foods. PIV removed. Flatus+, BM+    Patient has been discharged on 9/13/19 at 1253. Discharge instructions and education reviewed, medication schedule and follow up appts discussed. Pt has no questions. All belongings sent with pt. Pt transported home via daughter.

## 2019-09-13 NOTE — PROGRESS NOTES
General surgery progress note    Comfortable and pain is improved from yesterday.  Hernia has remained reduced.  On exam his abdomen is soft, hernia is partially reducible and not particularly tender.    Discussed the importance of weight loss in the short and long term care of ventral hernias.  Will utilize abdominal binder, he is very encouraged to lose weight after our discussion, he will see us in clinic and plan for outpatient laparoscopic ventral hernia repair after some weight loss.  Office phone number is [phone]      Total encounter time 30 minutes, more than half spent in counseling

## 2019-09-13 NOTE — PLAN OF CARE
Pt ambulating independently. BS hypo but pt passing flatus. Encourage ambulation. Dilaudid IV PRN for pain. NPO at midnight. Pt has no appetite. CT showed hernias. BG WNL.

## 2019-09-13 NOTE — DISCHARGE SUMMARY
"Discharge Summary    Pancho Costello MRN# [id number]   YOB: 1955 Age: 63 year old     Date of Admission:  9/12/2019  Date of Discharge:  9/13/2019  Admitting Physician:  Jd Penn MD  Discharge Physician:  Jd Penn MD  Discharging Service:  Hospitalist     Primary Provider: Chris Zuluaga          Admission Diagnoses:   SBO (small bowel obstruction) (H) [K56.609]          Discharge Diagnosis:   Patient Active Problem List   Diagnosis     Hypertension goal BP (blood pressure) < 140/80     Hyperlipidemia LDL goal <100     Advanced directives, counseling/discussion     Type 2 diabetes mellitus without complication, without long-term current use of insulin (H)     Morbid obesity (H)     Abdominal pain     Ventral hernia with bowel obstruction             Condition on Discharge:       Discharge vitals: Blood pressure 125/70, pulse 97, temperature 98.8  F (37.1  C), resp. rate 18, height 1.854 m (6' 1\"), weight 117.9 kg (260 lb), SpO2 92 %.         Gen: Well nourished, well developed, alert and oriented x 3, no acute distressed  HEENT: Atraumatic, normocephalic  Lungs: Clear to ausculation without wheezes, rhonchi, or rales  Heart: Regular rate and rhythm, no gallops or rubs, no murmurs  GI: Bowel sound normal, no hepatosplenomegaly or masses  Lymph: No lymphadenopathy or edema  Skin: No rashes          Procedures / Labs / Imaging:   Most Recent 3 CBC's:  Recent Labs   Lab Test 09/12/19  1141   WBC 13.1*   HGB 15.8   MCV 98         Most Recent 3 BMP's:  Recent Labs   Lab Test 09/12/19  1141 07/02/19  0812 06/13/18  0836    135 138   POTASSIUM 4.3 4.3 4.2   CHLORIDE 101 102 102   CO2 26 25 24   BUN 16 10 16   CR 0.76 0.81 0.77   ANIONGAP 7 8 12   YONAS 9.6 9.2 9.6   * 169* 152*     Most Recent 3 Troponin's:No lab results found.    Invalid input(s): TROP, TROPONINIES  Most Recent 3 INR's:No lab results found.  Most Recent 2 LFT's:  Recent Labs   Lab Test 09/12/19  1141 " 07/02/19  0812   AST 35 64*   ALT 72* 94*   ALKPHOS 86 88   BILITOTAL 0.8 0.7     Most Recent Cholesterol Panel:  Recent Labs   Lab Test 07/02/19  0812   CHOL 149   LDL 88   HDL 41   TRIG 99     Most Recent 6 Bacteria Isolates From Any Culture (See EPIC Reports for Culture Details):No lab results found.  Most Recent TSH, T4 and HgbA1c:   Recent Labs   Lab Test 07/02/19  0812  06/13/18  0836   TSH  --   --  1.35   A1C 7.2*   < > 7.2*    < > = values in this interval not displayed.     Results for orders placed or performed during the hospital encounter of 09/12/19   CT Abdomen Pelvis w Contrast    Narrative    CT ABDOMEN/PELVIS WITH CONTRAST   9/12/2019 1:08 PM     HISTORY: Abdomen distension.    TECHNIQUE: 131mL Isovue-370. CT images of the abdomen and pelvis  following nonionic intravenous contrast. Radiation dose for this scan  was reduced using automated exposure control, adjustment of the mA  and/or kV according to patient size, or iterative reconstruction  technique.    COMPARISON: None.    FINDINGS: There is mild diffuse decreased attenuation of the liver  without focal lesion. There is trace perihepatic ascites and trace  free fluid in the pelvis. The gallbladder, spleen, pancreas, adrenal  glands, and kidneys demonstrate no worrisome abnormalities.    There are two adjacent ventral hernias above the umbilicus, one of  which contains a loop of small bowel and causes a mechanical small  bowel obstruction. Dilated proximal loops of small bowel measure up to  3.7 cm. Distal small bowel is relatively decompressed. There is stool  and air in the colon. No free intra-abdominal air. No large abdominal,  retroperitoneal, or pelvic lymph nodes.    No suspicious bone abnormalities.      Impression    IMPRESSION:  1. There are two adjacent ventral hernias above the umbilicus, one of  which contains a loop of small bowel and causes an early mechanical  small bowel obstruction.  2. Fatty infiltration of the liver.  3.  Trace ascites.    JUANA SOLORIO MD             Medications Prior to Admission:     No medications prior to admission.             Discharge Medications:     Discharge Medication List as of 9/13/2019 11:51 AM      CONTINUE these medications which have NOT CHANGED    Details   aspirin 81 MG tablet Take 1 tablet (81 mg) by mouth daily, Disp-30 tablet, R-11, E-Prescribe      atorvastatin (LIPITOR) 20 MG tablet TAKE 1 TABLET(20 MG) BY MOUTH DAILY, Disp-90 tablet, R-3, E-Prescribe      blood glucose (ACCU-CHEK SMARTVIEW) test strip Testing 1-2 times per week, Disp-100 each, R-1, E-Prescribe      blood glucose monitoring (ACCU-CHEK FASTCLIX) lancets Test 1 times a day, Disp-100 each, R-1, E-Prescribe      glipiZIDE (GLUCOTROL XL) 5 MG 24 hr tablet Take 1 tablet (5 mg) by mouth daily, Disp-90 tablet, R-1, E-Prescribe      hydrochlorothiazide (HYDRODIURIL) 25 MG tablet Take 1 tablet by mouth every 24 hours, Historical      lisinopril (PRINIVIL/ZESTRIL) 20 MG tablet TAKE 1 TABLET(20 MG) BY MOUTH DAILY, Disp-90 tablet, R-3, E-Prescribe      metFORMIN (GLUCOPHAGE) 1000 MG tablet TAKE 1 TABLET BY MOUTH TWICE DAILY WITH MEALS, Disp-180 tablet, R-1, E-Prescribe      sildenafil (VIAGRA) 50 MG tablet Take 1 tablet (50 mg) by mouth daily as needed (as needed) 30 min to 4 hrs before sex. Do not use with nitroglycerin, terazosin or doxazosin., Disp-6 tablet, R-1, Local Print                   Brief History of Illness:   Pancho Costello is a 63 year old male who was admitted for incarcerated ventral hernia.          Hospital Course:   Patient was seen by general surgery in the emergency department.  His ventral hernia was reduced.  He was brought into the hospital under observation.  He was given IV fluids overnight.  The following morning patient denied nausea or vomiting and was passing flatus.  General surgery saw the patient in the morning.  They discussed follow-up and possible surgical repair.  The patient was discharged home  with an abdominal binder for support.    Greater than 35 minutes were spent in discharge planning.             Pending Results:   Unresulted Labs Ordered in the Past 30 Days of this Admission     No orders found for last 31 day(s).

## 2019-09-16 ENCOUNTER — TELEPHONE (OUTPATIENT)
Dept: FAMILY MEDICINE | Facility: CLINIC | Age: 64
End: 2019-09-16

## 2019-09-16 NOTE — TELEPHONE ENCOUNTER
Patient was discharged from [facility] on 09/13/2019 after being treated for Sbo (Small Bowel Obstruction) (H). Triage please follow up with patient.    .Sydnie EUCEDA    [facility] [place]

## 2019-09-16 NOTE — TELEPHONE ENCOUNTER
"Hospital/TCU/ED for chronic condition Discharge Protocol    \"Hi, my name is Elva Sotomayor RN, a registered nurse, and I am calling from [facility]  I am calling to follow up and see how things are going for you after your recent emergency visit/hospital/TCU stay.\"    Tell me how you are doing now that you are home?\" I am doing much better but it still hurts at times.      Discharge Instructions    \"Let's review your discharge instructions.  What is/are the follow-up recommendations?  Pt. Response: I need to schedule an appt with Dr. Zuluaga.  They want to do surgery at some point but want my body to heal and catch up for now.    \"Has an appointment with your primary care provider been scheduled?\"   Has physical scheduled for 9/24    \"When you see the provider, I would recommend that you bring your medications with you.\"    Medications    \"Tell me what changed about your medicines when you discharged?\"    Changes to chronic meds?    0-1    \"What questions do you have about your medications?\"    None     New diagnoses of heart failure, COPD, diabetes, or MI?    No              Post Discharge Medication Reconciliation Status: discharge medications reconciled, continue medications without change.    Was MTM referral placed (*Make sure to put transitions as reason for referral)?   No    Call Summary    \"What questions or concerns do you have about your recent visit and your follow-up care?\"     none    \"If you have questions or things don't continue to improve, we encourage you contact us through the main clinic number (give number).  Even if the clinic is not open, triage nurses are available 24/7 to help you.     We would like you to know that our clinic has extended hours (provide information).  We also have urgent care (provide details on closest location and hours/contact info)\"      \"Thank you for your time and take care!\"             "

## 2019-09-24 ENCOUNTER — OFFICE VISIT (OUTPATIENT)
Dept: FAMILY MEDICINE | Facility: CLINIC | Age: 64
End: 2019-09-24
Payer: COMMERCIAL

## 2019-09-24 VITALS
WEIGHT: 254 LBS | DIASTOLIC BLOOD PRESSURE: 70 MMHG | SYSTOLIC BLOOD PRESSURE: 126 MMHG | HEART RATE: 86 BPM | OXYGEN SATURATION: 94 % | TEMPERATURE: 97.5 F | HEIGHT: 72 IN | BODY MASS INDEX: 34.4 KG/M2

## 2019-09-24 DIAGNOSIS — I10 HYPERTENSION GOAL BP (BLOOD PRESSURE) < 140/80: ICD-10-CM

## 2019-09-24 DIAGNOSIS — E11.9 TYPE 2 DIABETES MELLITUS WITHOUT COMPLICATION, WITHOUT LONG-TERM CURRENT USE OF INSULIN (H): ICD-10-CM

## 2019-09-24 DIAGNOSIS — E78.5 HYPERLIPIDEMIA LDL GOAL <100: ICD-10-CM

## 2019-09-24 DIAGNOSIS — K43.6 VENTRAL HERNIA WITH BOWEL OBSTRUCTION: ICD-10-CM

## 2019-09-24 DIAGNOSIS — Z00.00 ENCOUNTER FOR ANNUAL PHYSICAL EXAM: ICD-10-CM

## 2019-09-24 DIAGNOSIS — E66.01 MORBID OBESITY (H): ICD-10-CM

## 2019-09-24 DIAGNOSIS — Z23 NEED FOR PROPHYLACTIC VACCINATION AND INOCULATION AGAINST INFLUENZA: Primary | ICD-10-CM

## 2019-09-24 LAB — HBA1C MFR BLD: 6.4 % (ref 0–5.6)

## 2019-09-24 PROCEDURE — 36415 COLL VENOUS BLD VENIPUNCTURE: CPT | Performed by: FAMILY MEDICINE

## 2019-09-24 PROCEDURE — 90471 IMMUNIZATION ADMIN: CPT | Performed by: FAMILY MEDICINE

## 2019-09-24 PROCEDURE — 90682 RIV4 VACC RECOMBINANT DNA IM: CPT | Performed by: FAMILY MEDICINE

## 2019-09-24 PROCEDURE — 83036 HEMOGLOBIN GLYCOSYLATED A1C: CPT | Performed by: FAMILY MEDICINE

## 2019-09-24 PROCEDURE — 99396 PREV VISIT EST AGE 40-64: CPT | Mod: 25 | Performed by: FAMILY MEDICINE

## 2019-09-24 ASSESSMENT — MIFFLIN-ST. JEOR: SCORE: 1982.76

## 2019-09-24 NOTE — PROGRESS NOTES
SUBJECTIVE:   CC: Pancho Costello is an 64 year old male who presents for preventative health visit.     Healthy Habits:     Getting at least 3 servings of Calcium per day:  Yes    Bi-annual eye exam:  Yes    Dental care twice a year:  Yes    Sleep apnea or symptoms of sleep apnea:  None and Daytime drowsiness    Diet:  Regular (no restrictions)    Frequency of exercise:  4-5 days/week    Duration of exercise:  30-45 minutes    Taking medications regularly:  Yes    Medication side effects:  None    PHQ-2 Total Score: 0    Additional concerns today:  No        Patient has history of diabetes.  As per patient is well controlled last A1c was 7.2.  Currently on Metformin and glipizide.  Also taking blood pressure and cholesterol medication.  Patient was recently admitted in the hospital due to acute abdominal pain.  CT scan indicated a ventral hernia with some mechanical small bowel obstruction.  That was monitored in the hospital and advised outpatient follow-up for possible surgery.  According to him his pain is improved but still have some discomfort.  No nausea no vomiting.  Able to eat without any difficulty.    Today's PHQ-2 Score:   PHQ-2 ( 1999 Pf[employer]) 9/23/2019   Q1: Little interest or pleasure in doing things 0   Q2: Feeling down, depressed or hopeless 0   PHQ-2 Score 0   Q1: Little interest or pleasure in doing things Not at all   Q2: Feeling down, depressed or hopeless Not at all   PHQ-2 Score 0       Abuse: Current or Past(Physical, Sexual or Emotional)- No  Do you feel safe in your environment? Yes    Social History     Tobacco Use     Smoking status: Never Smoker     Smokeless tobacco: Never Used   Substance Use Topics     Alcohol use: Yes     Alcohol/week: 0.0 standard drinks         Alcohol Use 9/23/2019   Prescreen: >3 drinks/day or >7 drinks/week? No   Prescreen: >3 drinks/day or >7 drinks/week? -       Last PSA:   PSA   Date Value Ref Range Status   07/14/2017 0.25 0 - 4 ug/L Final     Comment:      "Assay Method:  Chemiluminescence using Siemens Vista analyzer       Reviewed orders with patient. Reviewed health maintenance and updated orders accordingly - Yes  Lab work is in process    Reviewed and updated as needed this visit by clinical staff  Tobacco  Allergies  Meds         Reviewed and updated as needed this visit by Provider            Review of Systems  CONSTITUTIONAL: NEGATIVE for fever, chills, change in weight  INTEGUMENTARY/SKIN: NEGATIVE for worrisome rashes, moles or lesions  EYES: NEGATIVE for vision changes or irritation  ENT: NEGATIVE for ear, mouth and throat problems  RESP: NEGATIVE for significant cough or SOB  CV: NEGATIVE for chest pain, palpitations or peripheral edema  GI: POSITIVE for abdominal pain periumbilical   male: negative for dysuria, hematuria, decreased urinary stream, erectile dysfunction, urethral discharge  MUSCULOSKELETAL: NEGATIVE for significant arthralgias or myalgia  NEURO: NEGATIVE for weakness, dizziness or paresthesias  PSYCHIATRIC: NEGATIVE for changes in mood or affect    OBJECTIVE:   /70   Pulse 86   Temp 97.5  F (36.4  C) (Tympanic)   Ht 1.825 m (5' 11.85\")   Wt 115.2 kg (254 lb)   SpO2 94%   BMI 34.59 kg/m      Physical Exam  GENERAL: healthy, alert and no distress  EYES: Eyes grossly normal to inspection, PERRL and conjunctivae and sclerae normal  HENT: ear canals and TM's normal, nose and mouth without ulcers or lesions  NECK: no adenopathy, no asymmetry, masses, or scars and thyroid normal to palpation  RESP: lungs clear to auscultation - no rales, rhonchi or wheezes  CV: regular rate and rhythm, normal S1 S2, no S3 or S4, no murmur, click or rub, no peripheral edema and peripheral pulses strong  ABDOMEN: bowel sounds normal and perumblical hernia noted.  MS: no gross musculoskeletal defects noted, no edema  SKIN: no suspicious lesions or rashes  NEURO: Normal strength and tone, mentation intact and speech normal  PSYCH: mentation appears " "normal, affect normal/bright        ASSESSMENT/PLAN:   1. Encounter for annual physical exam      2. Need for prophylactic vaccination and inoculation against influenza    - INFLUENZA QUAD, RECOMBINANT, P-FREE (RIV4) (FLUBLOCK) [23439]  - Vaccine Administration, Initial [29818]    3. Ventral hernia with bowel obstruction  Follow up with surgery as planned. Denies any worsening symptoms. If any pain worsen, he is advised to follow up immediatly. Waning signs were discussed with the patient.    4. Type 2 diabetes mellitus without complication, without long-term current use of insulin (H)  Blood sugars are well controlled.  Can can continue the same dose of medication.  He is advised if his blood sugars are running low he will let us know we may need to adjust his medications.  Follow-up in 6 months for recheck.  - Hemoglobin A1c    5. Morbid obesity (H)      6. Hyperlipidemia LDL goal <100  Well-controlled    7. Hypertension goal BP (blood pressure) < 140/80   well controlled      COUNSELING:   Reviewed preventive health counseling, as reflected in patient instructions       Regular exercise       Healthy diet/nutrition    Estimated body mass index is 34.59 kg/m  as calculated from the following:    Height as of this encounter: 1.825 m (5' 11.85\").    Weight as of this encounter: 115.2 kg (254 lb).          reports that he has never smoked. He has never used smokeless tobacco.      Counseling Resources:  ATP IV Guidelines  Pooled Cohorts Equation Calculator  FRAX Risk Assessment  ICSI Preventive Guidelines  Dietary Guidelines for Americans, 2010  USDA's MyPlate  ASA Prophylaxis  Lung CA Screening    Chris Zuluaga MD  [facility] [place]  "

## 2019-09-30 ENCOUNTER — OFFICE VISIT (OUTPATIENT)
Dept: SURGERY | Facility: CLINIC | Age: 64
End: 2019-09-30
Payer: COMMERCIAL

## 2019-09-30 VITALS
DIASTOLIC BLOOD PRESSURE: 74 MMHG | BODY MASS INDEX: 34.4 KG/M2 | SYSTOLIC BLOOD PRESSURE: 116 MMHG | WEIGHT: 254 LBS | HEART RATE: 72 BPM | HEIGHT: 72 IN

## 2019-09-30 DIAGNOSIS — K43.6 VENTRAL HERNIA WITH BOWEL OBSTRUCTION: Primary | ICD-10-CM

## 2019-09-30 PROCEDURE — 99214 OFFICE O/P EST MOD 30 MIN: CPT | Performed by: SURGERY

## 2019-09-30 ASSESSMENT — MIFFLIN-ST. JEOR: SCORE: 1982.76

## 2019-09-30 NOTE — PROGRESS NOTES
[facility] Consultation    CHIEF COMPLAINT:  Chief Complaint   Patient presents with     Consult     ventral hernia and SBO       HISTORY OF PRESENT ILLNESS:  Pancho Costello is a 64 year old male who is seen in consultation at the request of Dr. Meyer for evaluation of symptomatic ventral hernia.  Since his recent hospital stay for incarcerated, and later reduced ventral hernia, Mr. Costello has been able to lose some weight, but continues to experience abdominal pains depending on his meals.  His bowel habits are otherwise normal.  He is looking forward to having definitive repair in the near future.    REVIEW OF SYSTEMS:  Constitutional:  Negative for chills, fatigue, fever and weight change.  Neuro: No extremity, nor facial weakness  Psych:  No unexpected changes in mood  Eyes:  Negative for new vision problems.  ENT:  Negative for ENT pain.  Cardiovascular:  Negative for chest pain, palpitations.  Respiratory:  Negative for cough, dyspnea.  Gastrointestinal: See HPI  Musculoskeletal:  Negative for new arthralgias or myalgias.  Integumentary: No new rashes no lesions    Past Medical History:   Diagnosis Date     Hypertension      Type 2 diabetes mellitus without complications (H) 10/14/2015       Past Surgical History:   Procedure Laterality Date     LIGATN/STRIP LONG & SHORT SAPHEN      2000       Family History has been reviewed.    Social History     Tobacco Use     Smoking status: Never Smoker     Smokeless tobacco: Never Used   Substance Use Topics     Alcohol use: Yes     Alcohol/week: 0.0 standard drinks       Patient Active Problem List   Diagnosis     Hypertension goal BP (blood pressure) < 140/80     Hyperlipidemia LDL goal <100     Advanced directives, counseling/discussion     Type 2 diabetes mellitus without complication, without long-term current use of insulin (H)     Morbid obesity (H)     Abdominal pain     Ventral hernia with bowel obstruction       No Known  "Allergies    Current Outpatient Medications   Medication Sig Dispense Refill     aspirin 81 MG tablet Take 1 tablet (81 mg) by mouth daily 30 tablet 11     atorvastatin (LIPITOR) 20 MG tablet TAKE 1 TABLET(20 MG) BY MOUTH DAILY 90 tablet 3     blood glucose (ACCU-CHEK SMARTVIEW) test strip Testing 1-2 times per week 100 each 1     blood glucose monitoring (ACCU-CHEK FASTCLIX) lancets Test 1 times a day 100 each 1     glipiZIDE (GLUCOTROL XL) 5 MG 24 hr tablet Take 1 tablet (5 mg) by mouth daily 90 tablet 1     lisinopril (PRINIVIL/ZESTRIL) 20 MG tablet TAKE 1 TABLET(20 MG) BY MOUTH DAILY 90 tablet 3     metFORMIN (GLUCOPHAGE) 1000 MG tablet TAKE 1 TABLET BY MOUTH TWICE DAILY WITH MEALS 180 tablet 1     sildenafil (VIAGRA) 50 MG tablet Take 1 tablet (50 mg) by mouth daily as needed (as needed) 30 min to 4 hrs before sex. Do not use with nitroglycerin, terazosin or doxazosin. 6 tablet 1       Vitals: /74   Pulse 72   Ht 1.825 m (5' 11.85\")   Wt 115.2 kg (254 lb)   BMI 34.59 kg/m    BMI= Body mass index is 34.59 kg/m .    EXAM:  GENERAL: healthy, alert and no distress     HEENT: moist mucus membranes, no scleral icterus,   CARDIOVASCULAR:  RRR, No JVD  NEURO:  Alert;  well oriented to time, place and person.  RESPIRATORY: non labored breathing  NECK: Neck supple. No noticeable masses.  No lymphadenopathy noted.  ABDOMEN/GI: soft, globose, at least partially reducible ventral hernia, no skin changes associated with it.  The area is sore but not particularly tender.  EXTREMITIES: warm and well perfused, no edema  SKIN: No suspicious lesions or rashes    LABS/Imaging: Recent CT scan of the abdomen was reviewed.    ASSESSMENT:  Pancho Costello suffers from 1. Ventral hernia with bowel obstruction    PLAN:  Laparoscopic ventral hernia repair with mesh    Pancho Costello understands the risk, benefits, hopeful outcomes, and possible complications, both in the short and in the long term.  All his questions answered, he " will like to proceed with the propose procedure in the near future.    It is my pleasure to participate in the care of Pancho Costello. Thank you for this consultation.     If you have any questions please give me a call.    Best regards,  Severiano Lopez MD    Please route or send letter to:  Primary Care Provider (PCP), Referring Provider and Include Progress Note    Total time with patient visit: 30 minutes more than half spent in counseling, explanation of procedures and coordination of care.

## 2019-10-01 ENCOUNTER — TELEPHONE (OUTPATIENT)
Dept: SURGERY | Facility: CLINIC | Age: 64
End: 2019-10-01

## 2019-10-01 ENCOUNTER — OFFICE VISIT (OUTPATIENT)
Dept: FAMILY MEDICINE | Facility: CLINIC | Age: 64
End: 2019-10-01
Payer: COMMERCIAL

## 2019-10-01 VITALS
OXYGEN SATURATION: 94 % | WEIGHT: 253 LBS | HEART RATE: 80 BPM | SYSTOLIC BLOOD PRESSURE: 134 MMHG | DIASTOLIC BLOOD PRESSURE: 70 MMHG | TEMPERATURE: 97.9 F | BODY MASS INDEX: 34.46 KG/M2

## 2019-10-01 DIAGNOSIS — E66.01 MORBID OBESITY (H): ICD-10-CM

## 2019-10-01 DIAGNOSIS — E11.9 TYPE 2 DIABETES MELLITUS WITHOUT COMPLICATION, WITHOUT LONG-TERM CURRENT USE OF INSULIN (H): ICD-10-CM

## 2019-10-01 DIAGNOSIS — Z01.818 PREOP GENERAL PHYSICAL EXAM: Primary | ICD-10-CM

## 2019-10-01 DIAGNOSIS — K43.9 VENTRAL HERNIA WITHOUT OBSTRUCTION OR GANGRENE: ICD-10-CM

## 2019-10-01 PROCEDURE — 99214 OFFICE O/P EST MOD 30 MIN: CPT | Performed by: FAMILY MEDICINE

## 2019-10-01 NOTE — PROGRESS NOTES
[facility]  [address] [address]  [phone]  Dept: [phone]    PRE-OP EVALUATION:  Today's date: 10/1/2019    Pancho Costello (: 1955) presents for pre-operative evaluation assessment as requested by Dr. Lopez.  He requires evaluation and anesthesia risk assessment prior to undergoing surgery/procedure for treatment of ventral hernia.    Primary Physician: Chris Zuluaga  Type of Anesthesia Anticipated: to be determined    Patient has a Health Care Directive or Living Will:  YES    Preop Questions 2019   Who is doing your surgery? Dr. Lopez   What are you having done? hernia   Date of Surgery/Procedure: 10/3/19   Facility or Hospital where procedure/surgery will be performed: [facility]   1.  Do you have a history of Heart attack, stroke, stent, coronary bypass surgery, or other heart surgery? No   2.  Do you ever have any pain or discomfort in your chest? No   3.  Do you have a history of  Heart Failure? No   4.   Are you troubled by shortness of breath when:  walking on a level surface, or up a slight hill, or at night? No   5.  Do you currently have a cold, bronchitis or other respiratory infection? No   6.  Do you have a cough, shortness of breath, or wheezing? No   7.  Do you sometimes get pains in the calves of your legs when you walk? No   8. Do you or anyone in your family have previous history of blood clots? No   9.  Do you or does anyone in your family have a serious bleeding problem such as prolonged bleeding following surgeries or cuts? No   10. Have you ever had problems with anemia or been told to take iron pills? No   11. Have you had any abnormal blood loss such as black, tarry or bloody stools? No   12. Have you ever had a blood transfusion? No   13. Have you or any of your relatives ever had problems with anesthesia? No   14. Do you have sleep apnea, excessive snoring or daytime drowsiness? No   15. Do you have any  prosthetic heart valves? No   16. Do you have prosthetic joints? No         HPI:     HPI related to upcoming procedure:      See problem list for active medical problems.  Problems all longstanding and stable, except as noted/documented.  See ROS for pertinent symptoms related to these conditions.      MEDICAL HISTORY:     Patient Active Problem List    Diagnosis Date Noted     Ventral hernia 10/01/2019     Priority: Medium     Added automatically from request for surgery [id number]       Ventral hernia with bowel obstruction 09/13/2019     Priority: Medium     Abdominal pain 09/12/2019     Priority: Medium     Type 2 diabetes mellitus without complication, without long-term current use of insulin (H) 07/14/2017     Priority: Medium     Morbid obesity (H) 07/14/2017     Priority: Medium     Advanced directives, counseling/discussion 12/10/2015     Priority: Medium     Advance Care Planning 12/10/2015: ACP Review of Chart / Resources Provided:  Reviewed chart for advance care plan.  Pancho Costello has no plan or code status on file. Discussed available resources and provided with information.   Confirmed/documented legally designated decision maker(s). Added by Cristal Moya             Hypertension goal BP (blood pressure) < 140/80 06/11/2015     Priority: Medium     Hyperlipidemia LDL goal <100 06/11/2015     Priority: Medium      Past Medical History:   Diagnosis Date     Hypertension      Type 2 diabetes mellitus without complications (H) 10/14/2015     Past Surgical History:   Procedure Laterality Date     LIGATN/STRIP LONG & SHORT SAPHEN      2000     Current Outpatient Medications   Medication Sig Dispense Refill     aspirin 81 MG tablet Take 1 tablet (81 mg) by mouth daily 30 tablet 11     atorvastatin (LIPITOR) 20 MG tablet TAKE 1 TABLET(20 MG) BY MOUTH DAILY 90 tablet 3     glipiZIDE (GLUCOTROL XL) 5 MG 24 hr tablet Take 1 tablet (5 mg) by mouth daily 90 tablet 1     lisinopril (PRINIVIL/ZESTRIL) 20 MG  tablet TAKE 1 TABLET(20 MG) BY MOUTH DAILY 90 tablet 3     metFORMIN (GLUCOPHAGE) 1000 MG tablet TAKE 1 TABLET BY MOUTH TWICE DAILY WITH MEALS 180 tablet 1     sildenafil (VIAGRA) 50 MG tablet Take 1 tablet (50 mg) by mouth daily as needed (as needed) 30 min to 4 hrs before sex. Do not use with nitroglycerin, terazosin or doxazosin. 6 tablet 1     blood glucose (ACCU-CHEK SMARTVIEW) test strip Testing 1-2 times per week 100 each 1     blood glucose monitoring (ACCU-CHEK FASTCLIX) lancets Test 1 times a day 100 each 1     OTC products: None, except as noted above    No Known Allergies   Latex Allergy: NO    Social History     Tobacco Use     Smoking status: Never Smoker     Smokeless tobacco: Never Used   Substance Use Topics     Alcohol use: Yes     Alcohol/week: 0.0 standard drinks     History   Drug Use No       REVIEW OF SYSTEMS:   CONSTITUTIONAL: NEGATIVE for fever, chills, change in weight  INTEGUMENTARY/SKIN: NEGATIVE for worrisome rashes, moles or lesions  EYES: NEGATIVE for vision changes or irritation  ENT/MOUTH: NEGATIVE for ear, mouth and throat problems  RESP: NEGATIVE for significant cough or SOB  BREAST: NEGATIVE for masses, tenderness or discharge  CV: NEGATIVE for chest pain, palpitations or peripheral edema  GI: NEGATIVE for nausea, abdominal pain, heartburn, or change in bowel habits  : NEGATIVE for frequency, dysuria, or hematuria  MUSCULOSKELETAL: NEGATIVE for significant arthralgias or myalgia  NEURO: NEGATIVE for weakness, dizziness or paresthesias  ENDOCRINE: NEGATIVE for temperature intolerance, skin/hair changes  HEME: NEGATIVE for bleeding problems  PSYCHIATRIC: NEGATIVE for changes in mood or affect    EXAM:   /70   Pulse 80   Temp 97.9  F (36.6  C) (Tympanic)   Wt 114.8 kg (253 lb)   SpO2 94%   BMI 34.46 kg/m    GENERAL APPEARANCE: healthy, alert and no distress  HENT: ear canals and TM's normal and nose and mouth without ulcers or lesions  RESP: lungs clear to auscultation -  no rales, rhonchi or wheezes  CV: regular rate and rhythm, normal S1 S2, no S3 or S4 and no murmur, click or rub   ABDOMEN: soft, nontender, no HSM or masses and bowel sounds normal  NEURO: Normal strength and tone, sensory exam grossly normal, mentation intact and speech normal    DIAGNOSTICS:       Recent Labs   Lab Test 09/24/19  0903 09/12/19  1141 07/02/19  0812   HGB  --  15.8  --    PLT  --  254  --    NA  --  134 135   POTASSIUM  --  4.3 4.3   CR  --  0.76 0.81   A1C 6.4*  --  7.2*        IMPRESSION:   Reason for surgery/procedure:   Diagnosis/reason for consult:     ICD-10-CM    1. Preop general physical exam Z01.818    2. Type 2 diabetes mellitus without complication, without long-term current use of insulin (H) E11.9    3. Morbid obesity (H) E66.01    4. Ventral hernia without obstruction or gangrene K43.9          The proposed surgical procedure is considered LOW risk.    REVISED CARDIAC RISK INDEX  The patient has the following serious cardiovascular risks for perioperative complications such as (MI, PE, VFib and 3  AV Block):  No serious cardiac risks  INTERPRETATION: 0 risks: Class I (very low risk - 0.4% complication rate)    The patient has the following additional risks for perioperative complications:  No identified additional risks      ICD-10-CM    1. Preop general physical exam Z01.818    2. Type 2 diabetes mellitus without complication, without long-term current use of insulin (H) E11.9    3. Morbid obesity (H) E66.01    4. Ventral hernia without obstruction or gangrene K43.9        RECOMMENDATIONS:     --Consult hospital rounder / IM to assist post-op medical management    --Patient is to take all scheduled medications on the day of surgery EXCEPT for modifications listed below.   Hold metformin and glipizide night prior to surgery.    APPROVAL GIVEN to proceed with proposed procedure, without further diagnostic evaluation       Signed Electronically by: Chris Zuluaga MD    Copy of this  evaluation report is provided to requesting physician.    [facility] Preop Guidelines    Revised Cardiac Risk Index

## 2019-10-01 NOTE — TELEPHONE ENCOUNTER
Type of surgery: Laparoscopic ventral hernia repair  Location of surgery: [facility]  Date and time of surgery: 10/3/19 at 1pm  Surgeon: Dr. Severiano Lopez  Pre-Op Appt Date: Patient to schedule  Post-Op Appt Date: Patient to schedule   Packet sent out: Yes  Pre-cert/Authorization completed:  Not Applicable  Date: 10/1/19

## 2019-10-03 ENCOUNTER — ANESTHESIA EVENT (OUTPATIENT)
Dept: SURGERY | Facility: CLINIC | Age: 64
End: 2019-10-03
Payer: COMMERCIAL

## 2019-10-03 ENCOUNTER — ANESTHESIA (OUTPATIENT)
Dept: SURGERY | Facility: CLINIC | Age: 64
End: 2019-10-03
Payer: COMMERCIAL

## 2019-10-03 ENCOUNTER — HOSPITAL ENCOUNTER (OUTPATIENT)
Facility: CLINIC | Age: 64
Discharge: HOME | End: 2019-10-03
Attending: SURGERY | Admitting: SURGERY
Payer: COMMERCIAL

## 2019-10-03 ENCOUNTER — APPOINTMENT (OUTPATIENT)
Dept: SURGERY | Facility: PHYSICIAN GROUP | Age: 64
End: 2019-10-03
Payer: COMMERCIAL

## 2019-10-03 VITALS
OXYGEN SATURATION: 94 % | HEIGHT: 72 IN | BODY MASS INDEX: 34.54 KG/M2 | DIASTOLIC BLOOD PRESSURE: 75 MMHG | TEMPERATURE: 96.6 F | RESPIRATION RATE: 14 BRPM | SYSTOLIC BLOOD PRESSURE: 122 MMHG | WEIGHT: 255 LBS | HEART RATE: 73 BPM

## 2019-10-03 DIAGNOSIS — K43.9 VENTRAL HERNIA: ICD-10-CM

## 2019-10-03 DIAGNOSIS — G89.18 ACUTE POST-OPERATIVE PAIN: Primary | ICD-10-CM

## 2019-10-03 LAB — GLUCOSE BLDC GLUCOMTR-MCNC: 122 MG/DL (ref 70–99)

## 2019-10-03 PROCEDURE — C1781 MESH (IMPLANTABLE): HCPCS | Performed by: SURGERY

## 2019-10-03 PROCEDURE — 37000009 ZZH ANESTHESIA TECHNICAL FEE, EACH ADDTL 15 MIN: Performed by: SURGERY

## 2019-10-03 PROCEDURE — 25000566 ZZH SEVOFLURANE, EA 15 MIN: Performed by: SURGERY

## 2019-10-03 PROCEDURE — 25000132 ZZH RX MED GY IP 250 OP 250 PS 637: Performed by: PHYSICIAN ASSISTANT

## 2019-10-03 PROCEDURE — 71000027 ZZH RECOVERY PHASE 2 EACH 15 MINS: Performed by: SURGERY

## 2019-10-03 PROCEDURE — 37000008 ZZH ANESTHESIA TECHNICAL FEE, 1ST 30 MIN: Performed by: SURGERY

## 2019-10-03 PROCEDURE — 25000125 ZZHC RX 250: Performed by: SURGERY

## 2019-10-03 PROCEDURE — 40000170 ZZH STATISTIC PRE-PROCEDURE ASSESSMENT II: Performed by: SURGERY

## 2019-10-03 PROCEDURE — 49653 ZZHC LAP VENT/ABD HERN PROC COMP: CPT | Performed by: SURGERY

## 2019-10-03 PROCEDURE — 25000128 H RX IP 250 OP 636: Performed by: ANESTHESIOLOGY

## 2019-10-03 PROCEDURE — 25000125 ZZHC RX 250: Performed by: NURSE ANESTHETIST, CERTIFIED REGISTERED

## 2019-10-03 PROCEDURE — 82962 GLUCOSE BLOOD TEST: CPT

## 2019-10-03 PROCEDURE — 25000128 H RX IP 250 OP 636: Performed by: NURSE ANESTHETIST, CERTIFIED REGISTERED

## 2019-10-03 PROCEDURE — 36000058 ZZH SURGERY LEVEL 3 EA 15 ADDTL MIN: Performed by: SURGERY

## 2019-10-03 PROCEDURE — 71000013 ZZH RECOVERY PHASE 1 LEVEL 1 EA ADDTL HR: Performed by: SURGERY

## 2019-10-03 PROCEDURE — 71000012 ZZH RECOVERY PHASE 1 LEVEL 1 FIRST HR: Performed by: SURGERY

## 2019-10-03 PROCEDURE — 49653 ZZHC LAP VENT/ABD HERN PROC COMP: CPT | Mod: AS | Performed by: PHYSICIAN ASSISTANT

## 2019-10-03 PROCEDURE — 25800030 ZZH RX IP 258 OP 636: Performed by: NURSE ANESTHETIST, CERTIFIED REGISTERED

## 2019-10-03 PROCEDURE — 25000128 H RX IP 250 OP 636: Performed by: SURGERY

## 2019-10-03 PROCEDURE — 27210794 ZZH OR GENERAL SUPPLY STERILE: Performed by: SURGERY

## 2019-10-03 PROCEDURE — 36000056 ZZH SURGERY LEVEL 3 1ST 30 MIN: Performed by: SURGERY

## 2019-10-03 PROCEDURE — 25800025 ZZH RX 258: Performed by: SURGERY

## 2019-10-03 DEVICE — MESH SYMBOTEX COMPOSITE STEX 20CM X 15CM SYM2015: Type: IMPLANTABLE DEVICE | Site: ABDOMEN | Status: FUNCTIONAL

## 2019-10-03 RX ORDER — FENTANYL CITRATE 0.05 MG/ML
25-50 INJECTION, SOLUTION INTRAMUSCULAR; INTRAVENOUS
Status: DISCONTINUED | OUTPATIENT
Start: 2019-10-03 | End: 2019-10-03 | Stop reason: HOSPADM

## 2019-10-03 RX ORDER — ONDANSETRON 2 MG/ML
INJECTION INTRAMUSCULAR; INTRAVENOUS PRN
Status: DISCONTINUED | OUTPATIENT
Start: 2019-10-03 | End: 2019-10-03

## 2019-10-03 RX ORDER — NALOXONE HYDROCHLORIDE 0.4 MG/ML
.1-.4 INJECTION, SOLUTION INTRAMUSCULAR; INTRAVENOUS; SUBCUTANEOUS
Status: DISCONTINUED | OUTPATIENT
Start: 2019-10-03 | End: 2019-10-03 | Stop reason: HOSPADM

## 2019-10-03 RX ORDER — DEXAMETHASONE SODIUM PHOSPHATE 4 MG/ML
INJECTION, SOLUTION INTRA-ARTICULAR; INTRALESIONAL; INTRAMUSCULAR; INTRAVENOUS; SOFT TISSUE PRN
Status: DISCONTINUED | OUTPATIENT
Start: 2019-10-03 | End: 2019-10-03

## 2019-10-03 RX ORDER — CEFAZOLIN SODIUM 2 G/100ML
2 INJECTION, SOLUTION INTRAVENOUS
Status: COMPLETED | OUTPATIENT
Start: 2019-10-03 | End: 2019-10-03

## 2019-10-03 RX ORDER — PROPOFOL 10 MG/ML
INJECTION, EMULSION INTRAVENOUS PRN
Status: DISCONTINUED | OUTPATIENT
Start: 2019-10-03 | End: 2019-10-03

## 2019-10-03 RX ORDER — OXYCODONE HYDROCHLORIDE 5 MG/1
5 TABLET ORAL
Status: COMPLETED | OUTPATIENT
Start: 2019-10-03 | End: 2019-10-03

## 2019-10-03 RX ORDER — SODIUM CHLORIDE, SODIUM LACTATE, POTASSIUM CHLORIDE, CALCIUM CHLORIDE 600; 310; 30; 20 MG/100ML; MG/100ML; MG/100ML; MG/100ML
INJECTION, SOLUTION INTRAVENOUS CONTINUOUS PRN
Status: DISCONTINUED | OUTPATIENT
Start: 2019-10-03 | End: 2019-10-03

## 2019-10-03 RX ORDER — LIDOCAINE HYDROCHLORIDE 20 MG/ML
INJECTION, SOLUTION INFILTRATION; PERINEURAL PRN
Status: DISCONTINUED | OUTPATIENT
Start: 2019-10-03 | End: 2019-10-03

## 2019-10-03 RX ORDER — CEFAZOLIN SODIUM 1 G/3ML
1 INJECTION, POWDER, FOR SOLUTION INTRAMUSCULAR; INTRAVENOUS SEE ADMIN INSTRUCTIONS
Status: DISCONTINUED | OUTPATIENT
Start: 2019-10-03 | End: 2019-10-03 | Stop reason: HOSPADM

## 2019-10-03 RX ORDER — HYDROMORPHONE HYDROCHLORIDE 1 MG/ML
.3-.5 INJECTION, SOLUTION INTRAMUSCULAR; INTRAVENOUS; SUBCUTANEOUS EVERY 10 MIN PRN
Status: DISCONTINUED | OUTPATIENT
Start: 2019-10-03 | End: 2019-10-03 | Stop reason: HOSPADM

## 2019-10-03 RX ORDER — PROPOFOL 10 MG/ML
INJECTION, EMULSION INTRAVENOUS CONTINUOUS PRN
Status: DISCONTINUED | OUTPATIENT
Start: 2019-10-03 | End: 2019-10-03

## 2019-10-03 RX ORDER — FENTANYL CITRATE 50 UG/ML
INJECTION, SOLUTION INTRAMUSCULAR; INTRAVENOUS PRN
Status: DISCONTINUED | OUTPATIENT
Start: 2019-10-03 | End: 2019-10-03

## 2019-10-03 RX ORDER — MEPERIDINE HYDROCHLORIDE 25 MG/ML
12.5 INJECTION INTRAMUSCULAR; INTRAVENOUS; SUBCUTANEOUS
Status: DISCONTINUED | OUTPATIENT
Start: 2019-10-03 | End: 2019-10-03 | Stop reason: HOSPADM

## 2019-10-03 RX ORDER — ONDANSETRON 2 MG/ML
4 INJECTION INTRAMUSCULAR; INTRAVENOUS EVERY 30 MIN PRN
Status: DISCONTINUED | OUTPATIENT
Start: 2019-10-03 | End: 2019-10-03 | Stop reason: HOSPADM

## 2019-10-03 RX ORDER — CYCLOBENZAPRINE HCL 10 MG
10 TABLET ORAL 3 TIMES DAILY PRN
Qty: 30 TABLET | Refills: 0 | Status: SHIPPED | OUTPATIENT
Start: 2019-10-03 | End: 2020-12-22

## 2019-10-03 RX ORDER — KETOROLAC TROMETHAMINE 30 MG/ML
INJECTION, SOLUTION INTRAMUSCULAR; INTRAVENOUS PRN
Status: DISCONTINUED | OUTPATIENT
Start: 2019-10-03 | End: 2019-10-03

## 2019-10-03 RX ORDER — MAGNESIUM HYDROXIDE 1200 MG/15ML
LIQUID ORAL PRN
Status: DISCONTINUED | OUTPATIENT
Start: 2019-10-03 | End: 2019-10-03 | Stop reason: HOSPADM

## 2019-10-03 RX ORDER — AMOXICILLIN 250 MG
1-2 CAPSULE ORAL 2 TIMES DAILY
Qty: 30 TABLET | Refills: 0 | Status: SHIPPED | OUTPATIENT
Start: 2019-10-03 | End: 2019-10-11

## 2019-10-03 RX ORDER — SODIUM CHLORIDE, SODIUM LACTATE, POTASSIUM CHLORIDE, CALCIUM CHLORIDE 600; 310; 30; 20 MG/100ML; MG/100ML; MG/100ML; MG/100ML
INJECTION, SOLUTION INTRAVENOUS CONTINUOUS
Status: DISCONTINUED | OUTPATIENT
Start: 2019-10-03 | End: 2019-10-03 | Stop reason: HOSPADM

## 2019-10-03 RX ORDER — GLYCOPYRROLATE 0.2 MG/ML
INJECTION, SOLUTION INTRAMUSCULAR; INTRAVENOUS PRN
Status: DISCONTINUED | OUTPATIENT
Start: 2019-10-03 | End: 2019-10-03

## 2019-10-03 RX ORDER — EPHEDRINE SULFATE 50 MG/ML
INJECTION, SOLUTION INTRAMUSCULAR; INTRAVENOUS; SUBCUTANEOUS PRN
Status: DISCONTINUED | OUTPATIENT
Start: 2019-10-03 | End: 2019-10-03

## 2019-10-03 RX ORDER — NEOSTIGMINE METHYLSULFATE 1 MG/ML
VIAL (ML) INJECTION PRN
Status: DISCONTINUED | OUTPATIENT
Start: 2019-10-03 | End: 2019-10-03

## 2019-10-03 RX ORDER — OMEGA-3 FATTY ACIDS/FISH OIL 300-1000MG
200 CAPSULE ORAL EVERY 4 HOURS PRN
COMMUNITY

## 2019-10-03 RX ORDER — ONDANSETRON 4 MG/1
4 TABLET, ORALLY DISINTEGRATING ORAL EVERY 30 MIN PRN
Status: DISCONTINUED | OUTPATIENT
Start: 2019-10-03 | End: 2019-10-03 | Stop reason: HOSPADM

## 2019-10-03 RX ORDER — OXYCODONE HYDROCHLORIDE 5 MG/1
5-10 TABLET ORAL EVERY 4 HOURS PRN
Qty: 40 TABLET | Refills: 0 | Status: SHIPPED | OUTPATIENT
Start: 2019-10-03 | End: 2020-12-22

## 2019-10-03 RX ADMIN — FENTANYL CITRATE 50 MCG: 50 INJECTION, SOLUTION INTRAMUSCULAR; INTRAVENOUS at 13:39

## 2019-10-03 RX ADMIN — Medication 5 MG: at 13:54

## 2019-10-03 RX ADMIN — CEFAZOLIN SODIUM 1 G: 2 INJECTION, SOLUTION INTRAVENOUS at 15:19

## 2019-10-03 RX ADMIN — ROCURONIUM BROMIDE 50 MG: 10 INJECTION INTRAVENOUS at 13:13

## 2019-10-03 RX ADMIN — ONDANSETRON 4 MG: 2 INJECTION INTRAMUSCULAR; INTRAVENOUS at 14:33

## 2019-10-03 RX ADMIN — NEOSTIGMINE METHYLSULFATE 5 MG: 1 INJECTION, SOLUTION INTRAVENOUS at 15:18

## 2019-10-03 RX ADMIN — FENTANYL CITRATE 100 MCG: 50 INJECTION, SOLUTION INTRAMUSCULAR; INTRAVENOUS at 13:13

## 2019-10-03 RX ADMIN — FENTANYL CITRATE 50 MCG: 0.05 INJECTION, SOLUTION INTRAMUSCULAR; INTRAVENOUS at 16:13

## 2019-10-03 RX ADMIN — LIDOCAINE HYDROCHLORIDE 80 MG: 20 INJECTION, SOLUTION INFILTRATION; PERINEURAL at 13:13

## 2019-10-03 RX ADMIN — KETOROLAC TROMETHAMINE 30 MG: 30 INJECTION, SOLUTION INTRAMUSCULAR at 15:20

## 2019-10-03 RX ADMIN — PROPOFOL 50 MCG/KG/MIN: 10 INJECTION, EMULSION INTRAVENOUS at 13:19

## 2019-10-03 RX ADMIN — CEFAZOLIN SODIUM 2 G: 2 INJECTION, SOLUTION INTRAVENOUS at 13:19

## 2019-10-03 RX ADMIN — HYDROMORPHONE HYDROCHLORIDE 0.5 MG: 1 INJECTION, SOLUTION INTRAMUSCULAR; INTRAVENOUS; SUBCUTANEOUS at 15:58

## 2019-10-03 RX ADMIN — PHENYLEPHRINE HYDROCHLORIDE 100 MCG: 10 INJECTION INTRAVENOUS at 13:29

## 2019-10-03 RX ADMIN — SODIUM CHLORIDE, POTASSIUM CHLORIDE, SODIUM LACTATE AND CALCIUM CHLORIDE: 600; 310; 30; 20 INJECTION, SOLUTION INTRAVENOUS at 14:51

## 2019-10-03 RX ADMIN — SODIUM CHLORIDE, POTASSIUM CHLORIDE, SODIUM LACTATE AND CALCIUM CHLORIDE: 600; 310; 30; 20 INJECTION, SOLUTION INTRAVENOUS at 13:07

## 2019-10-03 RX ADMIN — HYDROMORPHONE HYDROCHLORIDE 0.5 MG: 1 INJECTION, SOLUTION INTRAMUSCULAR; INTRAVENOUS; SUBCUTANEOUS at 15:44

## 2019-10-03 RX ADMIN — PROPOFOL 200 MG: 10 INJECTION, EMULSION INTRAVENOUS at 13:13

## 2019-10-03 RX ADMIN — OXYCODONE HYDROCHLORIDE 5 MG: 5 TABLET ORAL at 16:38

## 2019-10-03 RX ADMIN — Medication 5 MG: at 14:21

## 2019-10-03 RX ADMIN — DEXAMETHASONE SODIUM PHOSPHATE 4 MG: 4 INJECTION, SOLUTION INTRA-ARTICULAR; INTRALESIONAL; INTRAMUSCULAR; INTRAVENOUS; SOFT TISSUE at 13:24

## 2019-10-03 RX ADMIN — GLYCOPYRROLATE 0.8 MG: 0.2 INJECTION, SOLUTION INTRAMUSCULAR; INTRAVENOUS at 15:18

## 2019-10-03 ASSESSMENT — COPD QUESTIONNAIRES: COPD: 0

## 2019-10-03 ASSESSMENT — MIFFLIN-ST. JEOR: SCORE: 1989.67

## 2019-10-03 NOTE — DISCHARGE INSTRUCTIONS
[facility]  Discharge Instructions: Post-Operative Laparoscopic Ventral Hernia    ACTIVITY    Take frequent, short walks and increase your activity gradually.      Avoid strenuous physical activity or heavy lifting greater than 15 lbs. for 4 weeks.  You may climb stairs.    You may drive without restrictions when you are not using any prescription pain medication and feel comfortable in a car.    You may return to work/school when you are comfortable without any prescription pain medication.     You may wear an abdominal binder for comfort for 2-3 weeks from your surgery.  You can wash the abdominal binder and dry it on low heat in the dryer.    WOUND CARE    You may remove your outer dressing or Band-Aids and shower 48 hours after the surgery.  Pat your incisions dry and leave them open to air.  Re-apply dressing (Band-Aids or gauze/tape) as needed for comfort or drainage.    You may have steri-strips (looks like white tape) on your incision.  You may peel off the steri-strips 2 weeks after your surgery if they have not peeled off on their own.     Do not soak your incisions in a tub or pool for 2 weeks.     Do not apply any lotions, creams, or ointments to your incisions.    A ridge under your incisions is normal and will gradually resolve.    DIET    Start with liquids, then gradually resume your regular diet as tolerated.     Drink plenty of fluids to stay hydrated.    PAIN    Expect some tenderness and discomfort at the incision site(s).  Use the prescribed pain medication/muscle relaxant at your discretion.  Expect gradual resolution of your pain over several days.    You may take ibuprofen with food (unless you have been told not to) or acetaminophen/APAP/Tylenol instead of or in addition to your prescribed pain medication.      You may apply ice to your incisions in 20 minute intervals as needed for the next 48 hours.  After that time, consider switching to heat if you  prefer.    EXPECTATIONS    Pain medications can cause constipation.  Limit use when possible.  Take over the counter stool softener/stimulant, such as Colace or Senna, 1-2 times a day with plenty of water.  You may take a mild over the counter laxative, such as Miralax or a suppository, as needed.  You may take 1 oz. (2 tablespoons) Milk of Magnesia the evening following surgery to encourage bowel movement.  You make discontinue these medications once you are having regular bowel movements and/or are no longer taking your narcotic pain medication.    You may have shoulder or upper back discomfort due to the gas used in surgery.  This is temporary and should resolve in 48-72 hours.  Short, frequent walks may help with this.    FOLLOW UP    Follow up with Dr. Lopez in 2 weeks. Call [phone] to schedule an appointment or if you have any concerns. We are located at [address] [place] [address].     CALL OUR OFFICE [phone] IF YOU HAVE:     Chills or fever above 101 F.    Increased redness or drainage at your incisions.    Significant bleeding.    Pain not relieved by your pain medication or rest.    Increasing pain after the first 48 hours.    Any other concerns or questions.    Revised January 2018    Same Day Surgery Discharge Instructions for  Sedation and General Anesthesia       It's not unusual to feel dizzy, light-headed or faint for up to 24 hours after surgery or while taking pain medication.  If you have these symptoms: sit for a few minutes before standing and have someone assist you when you get up to walk or use the bathroom.      You should rest and relax for the next 24 hours. We recommend you make arrangements to have an adult stay with you for at least 24 hours after your discharge.  Avoid hazardous and strenuous activity.      DO NOT DRIVE any vehicle or operate mechanical equipment for 24 hours following the end of your surgery.  Even though you may feel normal,  your reactions may be affected by the medication you have received.      Do not drink alcoholic beverages for 24 hours following surgery.       Slowly progress to your regular diet as you feel able. It's not unusual to feel nauseated and/or vomit after receiving anesthesia.  If you develop these symptoms, drink clear liquids (apple juice, ginger ale, broth, 7-up, etc. ) until you feel better.  If your nausea and vomiting persists for 24 hours, please notify your surgeon.        All narcotic pain medications, along with inactivity and anesthesia, can cause constipation. Drinking plenty of liquids and increasing fiber intake will help.      For any questions of a medical nature, call your surgeon.      Do not make important decisions for 24 hours.      If you had general anesthesia, you may have a sore throat for a couple of days related to the breathing tube used during surgery.  You may use Cepacol lozenges to help with this discomfort.  If it worsens or if you develop a fever, contact your surgeon.       If you feel your pain is not well managed with the pain medications prescribed by your surgeon, please contact your surgeon's office to let them know so they can address your concerns.     Today you received Toradol, an antiinflammatory medication similar to Ibuprofen.  You should not take other antiinflammatory medication, such as Ibuprofen, Motrin, Advil, Aleve, Naprosyn, etc until 9:20 pm.       **If you have questions or concerns about your procedure,  call Dr. Lopez at [phone]**

## 2019-10-03 NOTE — ANESTHESIA PREPROCEDURE EVALUATION
Anesthesia Pre-Procedure Evaluation    Patient: Pancho Costello   MRN: [id number] : 1955          Preoperative Diagnosis: Ventral hernia [K43.9]    Procedure(s):  LAPAROSCOPIC VENTRAL HERNIA REPAIR    Past Medical History:   Diagnosis Date     Complication of anesthesia      Hypertension      Obese      Type 2 diabetes mellitus without complications (H) 10/14/2015     Past Surgical History:   Procedure Laterality Date     LIGATN/STRIP LONG & SHORT SAPHEN      2000       Anesthesia Evaluation     .             ROS/MED HX    ENT/Pulmonary:      (-) asthma and COPD   Neurologic:      (-) CVA and TIA   Cardiovascular:     (+) Dyslipidemia, hypertension----. : . . . :. .       METS/Exercise Tolerance:     Hematologic:         Musculoskeletal:         GI/Hepatic: Comment: Ventral hernia       (-) liver disease   Renal/Genitourinary:      (-) renal disease   Endo:     (+) type II DM Obesity, .      Psychiatric:         Infectious Disease:         Malignancy:         Other:                          Physical Exam  Normal systems: dental    Airway   Mallampati: III  TM distance: >3 FB  Neck ROM: full    Dental     Cardiovascular   Rhythm and rate: regular      Pulmonary    breath sounds clear to auscultation            Lab Results   Component Value Date    WBC 13.1 (H) 2019    HGB 15.8 2019    HCT 45.7 2019     2019     2019    POTASSIUM 4.3 2019    CHLORIDE 101 2019    CO2 26 2019    BUN 16 2019    CR 0.76 2019     (H) 2019    YONAS 9.6 2019    ALBUMIN 3.9 2019    PROTTOTAL 7.9 2019    ALT 72 (H) 2019    AST 35 2019    ALKPHOS 86 2019    BILITOTAL 0.8 2019    LIPASE 80 2019    TSH 1.35 2018       Preop Vitals  BP Readings from Last 3 Encounters:   10/03/19 (!) 140/84   10/01/19 134/70   19 116/74    Pulse Readings from Last 3 Encounters:   10/01/19 80   19 72   19  86      Resp Readings from Last 3 Encounters:   10/03/19 20   09/13/19 18   11/02/16 12    SpO2 Readings from Last 3 Encounters:   10/03/19 97%   10/01/19 94%   09/24/19 94%      Temp Readings from Last 1 Encounters:   10/03/19 35.9  C (96.6  F) (Oral)    Ht Readings from Last 1 Encounters:   10/03/19 1.829 m (6')      Wt Readings from Last 1 Encounters:   10/03/19 115.7 kg (255 lb)    Estimated body mass index is 34.58 kg/m  as calculated from the following:    Height as of this encounter: 1.829 m (6').    Weight as of this encounter: 115.7 kg (255 lb).       Anesthesia Plan      History & Physical Review  History and physical reviewed and following examination; no interval change.    ASA Status:  2 .    NPO Status:  > 8 hours    Plan for General and ETT   PONV prophylaxis:  Ondansetron (or other 5HT-3) and Dexamethasone or Solumedrol  Additional equipment: Videolaryngoscope Propofol gtt       Postoperative Care      Consents  Anesthetic plan, risks, benefits and alternatives discussed with:  Patient..                 Nury Carlton

## 2019-10-03 NOTE — ANESTHESIA CARE TRANSFER NOTE
Patient: Pancho Costello    Procedure(s):  LAPAROSCOPIC VENTRAL HERNIA REPAIR    Diagnosis: Ventral hernia [K43.9]  Diagnosis Additional Information: No value filed.    Anesthesia Type:   General, ETT     Note:  Airway :Face Mask  Patient transferred to:PACU  Comments: Pt exhibits spont resps, all monitors and alarms on in pacu, report given to RN, shellys.Handoff Report: Identifed the Patient, Identified the Reponsible Provider, Reviewed the pertinent medical history, Discussed the surgical course, Reviewed Intra-OP anesthesia mangement and issues during anesthesia, Set expectations for post-procedure period and Allowed opportunity for questions and acknowledgement of understanding      Vitals: (Last set prior to Anesthesia Care Transfer)    CRNA VITALS  10/3/2019 1505 - 10/3/2019 1542      10/3/2019             Pulse:  83    SpO2:  96 %    Resp Rate (observed):  (!) 2    Resp Rate (set):  10                Electronically Signed By: JHONY Starr CRNA  October 3, 2019  3:42 PM

## 2019-10-03 NOTE — ANESTHESIA POSTPROCEDURE EVALUATION
Patient: Pancho Costello    Procedure(s):  LAPAROSCOPIC INCARCERATED VENTRAL HERNIA REPAIR, LAPAROSCOPIC LYSIS OF ADHESIONS  Laparoscopic lysis adhesions    Diagnosis:Ventral hernia [K43.9]  Diagnosis Additional Information: No value filed.    Anesthesia Type:  General, ETT    Note:  Anesthesia Post Evaluation    Patient location during evaluation: PACU  Patient participation: Able to fully participate in evaluation  Level of consciousness: awake, awake and alert and responsive to verbal stimuli  Pain management: adequate  Airway patency: patent  Cardiovascular status: acceptable  Respiratory status: acceptable  Hydration status: acceptable  PONV: none     Anesthetic complications: None          Last vitals:  Vitals:    10/03/19 1119 10/03/19 1539 10/03/19 1545   BP: (!) 140/84 127/73 123/71   Pulse:  73 71   Resp: 20 (!) 88 12   Temp: 35.9  C (96.6  F)     SpO2: 97% 97% 99%         Electronically Signed By: Nury Carlton  October 3, 2019  4:18 PM

## 2019-10-03 NOTE — BRIEF OP NOTE
General Surgery Brief Operative Note    Pre-operative diagnosis: Ventral hernia [K43.9]   Post-operative diagnosis Same   Procedure: Procedure(s):  LAPAROSCOPIC VENTRAL HERNIA REPAIR, lysis of adhesions   Surgeon(s), Assistant(s): Surgeon(s) and Role:     * Severiano Lopez MD - Primary     * Mis Chapa PA-C - Assisting   Estimated blood loss: 15 mL   Drains: None   Specimens: * No specimens in log *   Findings: See postop diagnosis   Condition: Stable   Comments:      Mis Chapa PA-C See dictated operative report for full details         \

## 2019-10-03 NOTE — OR NURSING
O2 sats 91-93% on room air, at times dropping to 88%. Doing incentive spirometer, up to 1250 ml, gets sats up to 95% with this. Wife in PACU with patient, discharge instructions reviewed with patient and wife. Will continue to work with IS, monitor sats.

## 2019-10-04 NOTE — OR NURSING
Dr. Lopez contacted after bladder scan for 414 ml and patient unable to void. Straight cath done for 450 ml. Patient tolerated well. Discharged to home.    Patient given instructions from Dr. Lopez to  Flomax tonight at [facility] in [place] and begin taking this tonight. Wife called daughter to have her pick this up. Patient states he will take this when he gets home.

## 2019-10-05 NOTE — OP NOTE
Preoperative diagnosis:  partially reducible ventral hernia.   Postoperative diagnosis: Incarcerated ventral hernia.   Operative procedure:   1. Laparoscopic repair of incarcerated ventral hernia   2. Placement of 13 x 16 cm mesh.    3.  Lysis of adhesions.    Surgeon: Severiano Lopez M.D.   Assistant:Mis Chapa PA-C, The physicians assistant was medically necessary for their expertise in camera management, suctioning, suturing, and retraction.    Anesthesia: GETA   EBL: Less than 10 mL.   Events:   After induction of general endotracheal anesthesia Pancho Costello abdomen was prepped and draped in the usual sterile fashion. With the direct visualization trocar in the left upper quadrant the abdomen was entered and pneumoperitoneum was established. Two additional trocars were placed along the left flank, one of them being a 12 mm size. We then encounter omentum and bowel going into the ventral hernia along the midline in the epigastric region.  We then spent about 30 minutes lysing adhesions and liberalizing all the incarcerated tissue. The dissected tissue was inspected and  no evidence of injuries noted.  We then utilized multiple 0 Nurolon sutures re-approximating primarily the hernia. Then placed a 16 x 13 cm symbotex mesh to completely cover the area of concern. The mesh was secured with transabdominal 0 Nurolon suture. We then utilized secure straps around the periphery of the mesh and throughout its body. The abdomen was surveyed and no injuries noted. The 12 mm trocar site was closed with 0 Vicryl suture using Terrell Cyndi device.  Trocars were removed under direct visualization without evidence of bleeding. Pneumoperitoneum was released and skin approximated with absorbable suture. Steri-Strips and dressing applied. No immediate complications. Counts reported correct.

## 2019-10-16 ENCOUNTER — OFFICE VISIT (OUTPATIENT)
Dept: SURGERY | Facility: CLINIC | Age: 64
End: 2019-10-16
Payer: COMMERCIAL

## 2019-10-16 VITALS — HEART RATE: 78 BPM | DIASTOLIC BLOOD PRESSURE: 72 MMHG | SYSTOLIC BLOOD PRESSURE: 138 MMHG

## 2019-10-16 DIAGNOSIS — Z09 SURGERY FOLLOW-UP EXAMINATION: Primary | ICD-10-CM

## 2019-10-16 DIAGNOSIS — K43.6 VENTRAL HERNIA WITH BOWEL OBSTRUCTION: ICD-10-CM

## 2019-10-16 PROCEDURE — 99024 POSTOP FOLLOW-UP VISIT: CPT | Performed by: SURGERY

## 2019-10-16 NOTE — LETTER
[facility]    [address], [address]  [place], [place] [address]  Phone [phone]  Fax [phone]    [address], [address]  [place] [address]  Phone [phone]  Fax [phone]    [email or web address]     2019    Re: Pancho Costello  : 1955     [facility] Post-Op Note    First postoperative visit after laparoscopic repair of incarcerated hernia.  Slow but definitive improvement.  He is now looking forward to increasing his activity while respecting the weight restriction.  He has continued to work on weight loss.     On exam his incisions have healed nicely without evidence of infection or hernia.     Reviewed the limitations and recommendations for the next several weeks, he understands     He will return to see us as needed.     Questions please call me.     Best regards.  Severiano Lopez MD

## 2019-10-16 NOTE — LETTER
[facility]    [address], [address]  [place], [place] [address]  Phone [phone]  Fax [phone]    [address], [address]  [place] [address]  Phone [phone]  Fax [phone]    [email or web address]     2019    Re: Pancho Charltonkris  : 1955    Operative Report    Preoperative diagnosis:  partially reducible ventral hernia.   Postoperative diagnosis: Incarcerated ventral hernia.   Operative procedure:   1. Laparoscopic repair of incarcerated ventral hernia   2. Placement of 13 x 16 cm mesh.    3.  Lysis of adhesions.     Surgeon: Severiano Lopez M.D.   Assistant:Mis Chapa PA-C, The physicians assistant was medically necessary for their expertise in camera management, suctioning, suturing, and retraction.   Anesthesia: GETA   EBL: Less than 10 mL.   Events:   After induction of general endotracheal anesthesia Pancho Costello abdomen was prepped and draped in the usual sterile fashion. With the direct visualization trocar in the left upper quadrant the abdomen was entered and pneumoperitoneum was established. Two additional trocars were placed along the left flank, one of them being a 12 mm size. We then encounter omentum and bowel going into the ventral hernia along the midline in the epigastric region.  We then spent about 30 minutes lysing adhesions and liberalizing all the incarcerated tissue. The dissected tissue was inspected and  no evidence of injuries noted.  We then utilized multiple 0 Nurolon sutures re-approximating primarily the hernia. Then placed a 16 x 13 cm symbotex mesh to completely cover the area of concern. The mesh was secured with transabdominal 0 Nurolon suture. We then utilized secure straps around the periphery of the mesh and throughout its body. The abdomen was surveyed and no injuries noted. The 12 mm trocar site was closed with 0 Vicryl suture using Terrell Cyndi device.   Trocars were removed under direct visualization without evidence of bleeding. Pneumoperitoneum was released and skin approximated with absorbable suture. Steri-Strips and dressing applied. No immediate complications. Counts reported correct.

## 2019-10-29 ENCOUNTER — HEALTH MAINTENANCE LETTER (OUTPATIENT)
Age: 64
End: 2019-10-29

## 2020-03-16 ENCOUNTER — TRANSFERRED RECORDS (OUTPATIENT)
Dept: HEALTH INFORMATION MANAGEMENT | Facility: CLINIC | Age: 65
End: 2020-03-16

## 2020-04-14 DIAGNOSIS — E11.9 TYPE 2 DIABETES MELLITUS WITHOUT COMPLICATION, WITHOUT LONG-TERM CURRENT USE OF INSULIN (H): ICD-10-CM

## 2020-04-14 NOTE — TELEPHONE ENCOUNTER
Routing refill request to provider for review/approval because:  Labs not current:  A1C  Overdue for Diabetes follow up.     Lisandra Hugo RN, BSN  [facility] [place], [place]

## 2020-04-17 ENCOUNTER — VIRTUAL VISIT (OUTPATIENT)
Dept: FAMILY MEDICINE | Facility: CLINIC | Age: 65
End: 2020-04-17
Payer: COMMERCIAL

## 2020-04-17 VITALS — BODY MASS INDEX: 34.58 KG/M2 | WEIGHT: 255 LBS

## 2020-04-17 DIAGNOSIS — E11.9 TYPE 2 DIABETES MELLITUS WITHOUT COMPLICATION, WITHOUT LONG-TERM CURRENT USE OF INSULIN (H): ICD-10-CM

## 2020-04-17 DIAGNOSIS — E78.5 HYPERLIPIDEMIA LDL GOAL <100: Primary | ICD-10-CM

## 2020-04-17 PROCEDURE — 99214 OFFICE O/P EST MOD 30 MIN: CPT | Mod: TEL | Performed by: FAMILY MEDICINE

## 2020-04-17 RX ORDER — BLOOD SUGAR DIAGNOSTIC
STRIP MISCELLANEOUS
Qty: 100 STRIP | Refills: 0 | Status: SHIPPED | OUTPATIENT
Start: 2020-04-17 | End: 2020-07-13

## 2020-04-17 RX ORDER — GLIPIZIDE 5 MG/1
5 TABLET, FILM COATED, EXTENDED RELEASE ORAL DAILY
Qty: 90 TABLET | Refills: 1 | Status: SHIPPED | OUTPATIENT
Start: 2020-04-17 | End: 2020-10-05

## 2020-04-17 NOTE — TELEPHONE ENCOUNTER
Routing refill request to provider for review/approval because:  Change in testing frequency directions. Current on med list state tesing 1-2 times a week. Rx requesting daily checks.  Shannan Su RN

## 2020-04-17 NOTE — PROGRESS NOTES
"Pancho Costello is a 64 year old male who is being evaluated via a billable telephone visit.      The patient has been notified of following:     \"This telephone visit will be conducted via a call between you and your physician/provider. We have found that certain health care needs can be provided without the need for a physical exam.  This service lets us provide the care you need with a short phone conversation.  If a prescription is necessary we can send it directly to your pharmacy.  If lab work is needed we can place an order for that and you can then stop by our lab to have the test done at a later time.    Telephone visits are billed at different rates depending on your insurance coverage. During this emergency period, for some insurers they may be billed the same as an in-person visit.  Please reach out to your insurance provider with any questions.    If during the course of the call the physician/provider feels a telephone visit is not appropriate, you will not be charged for this service.\"    Patient has given verbal consent for Telephone visit?  Yes    How would you like to obtain your AVS? MyChart    Subjective     Pancho Costello is a 64 year old male who presents to clinic today for the following health issues:    Diabetes Follow-up  Pt currently out of state in [place]   How often are you checking your blood sugar? A few times a week  What time of day are you checking your blood sugars (select all that apply)?  Before meals  Have you had any blood sugars above 200?  No  Have you had any blood sugars below 70?  No    What symptoms do you notice when your blood sugar is low?  Every now and then, gets a little flush - increased fluids    What concerns do you have today about your diabetes? None     Do you have any of these symptoms? (Select all that apply)  Tingling in feet      BP Readings from Last 2 Encounters:   10/16/19 138/72   10/03/19 122/75     Hemoglobin A1C (%)   Date Value   09/24/2019 6.4 (H) "   07/02/2019 7.2 (H)     LDL Cholesterol Calculated (mg/dL)   Date Value   07/02/2019 88   06/13/2018 95     Patient check his blood sugar in the morning and is mostly under 120.    How many servings of fruits and vegetables do you eat daily?  4 or more    On average, how many sweetened beverages do you drink each day (Examples: soda, juice, sweet tea, etc.  Do NOT count diet or artificially sweetened beverages)?   Cranberry juice daily    How many days per week do you exercise enough to make your heart beat faster? Walking and biking 6    How many minutes a day do you exercise enough to make your heart beat faster? 30 - 60    How many days per week do you miss taking your medication? 0            Patient Active Problem List   Diagnosis     Hypertension goal BP (blood pressure) < 140/80     Hyperlipidemia LDL goal <100     Advanced directives, counseling/discussion     Type 2 diabetes mellitus without complication, without long-term current use of insulin (H)     Morbid obesity (H)     Abdominal pain     Ventral hernia with bowel obstruction     Ventral hernia     Past Surgical History:   Procedure Laterality Date     LAPAROSCOPIC HERNIORRHAPHY VENTRAL N/A 10/3/2019    Procedure: LAPAROSCOPIC INCARCERATED VENTRAL HERNIA REPAIR, LAPAROSCOPIC LYSIS OF ADHESIONS;  Surgeon: Severiano Lopez MD;  Location:  OR     LAPAROSCOPIC LYSIS ADHESIONS N/A 10/3/2019    Procedure: Laparoscopic lysis adhesions;  Surgeon: Severiano Lopez MD;  Location:  OR     LIGATN/STRIP LONG & SHORT SAPHEN      2000       Social History     Tobacco Use     Smoking status: Never Smoker     Smokeless tobacco: Never Used   Substance Use Topics     Alcohol use: Yes     Alcohol/week: 0.0 standard drinks     Family History   Problem Relation Age of Onset     Heart Failure Mother      Breast Cancer Mother      Diabetes Mother          Current Outpatient Medications   Medication Sig Dispense Refill     aspirin 81 MG tablet Take 1 tablet (81 mg) by  mouth daily 30 tablet 11     atorvastatin (LIPITOR) 20 MG tablet TAKE 1 TABLET(20 MG) BY MOUTH DAILY 90 tablet 3     glipiZIDE (GLUCOTROL XL) 5 MG 24 hr tablet Take 1 tablet (5 mg) by mouth daily 90 tablet 1     ibuprofen (ADVIL/MOTRIN) 200 MG capsule Take 200 mg by mouth every 4 hours as needed for fever 2 tabs       lisinopril (PRINIVIL/ZESTRIL) 20 MG tablet TAKE 1 TABLET(20 MG) BY MOUTH DAILY 90 tablet 3     [START ON 6/24/2020] metFORMIN (GLUCOPHAGE) 1000 MG tablet TAKE 1 TABLET BY MOUTH TWICE DAILY WITH MEALS 180 tablet 0     sildenafil (VIAGRA) 50 MG tablet Take 1 tablet (50 mg) by mouth daily as needed (as needed) 30 min to 4 hrs before sex. Do not use with nitroglycerin, terazosin or doxazosin. 6 tablet 1     blood glucose (ACCU-CHEK SMARTVIEW) test strip Testing 1-2 times per week 100 each 1     blood glucose monitoring (ACCU-CHEK FASTCLIX) lancets Test 1 times a day 100 each 1     cyclobenzaprine (FLEXERIL) 10 MG tablet Take 1 tablet (10 mg) by mouth 3 times daily as needed for muscle spasms (Patient not taking: Reported on 4/17/2020) 30 tablet 0     oxyCODONE (ROXICODONE) 5 MG tablet Take 1-2 tablets (5-10 mg) by mouth every 4 hours as needed for moderate to severe pain (Patient not taking: Reported on 4/17/2020) 40 tablet 0       Reviewed and updated as needed this visit by Provider         Review of Systems   ROS COMP: Constitutional, HEENT, cardiovascular, pulmonary, gi and gu systems are negative, except as otherwise noted.       Objective   Reported vitals:  There were no vitals taken for this visit.   healthy, alert and no distress  PSYCH: Alert and oriented times 3; coherent speech, normal   rate and volume, able to articulate logical thoughts, able   to abstract reason, no tangential thoughts, no hallucinations   or delusions  His affect is normal  RESP: No cough, no audible wheezing, able to talk in full sentences  Remainder of exam unable to be completed due to telephone visits    Diagnostic  Test Results:  Labs reviewed in Epic        Assessment/Plan:  1. Type 2 diabetes mellitus without complication, without long-term current use of insulin (H)  Patient blood sugar are relatively stable.  He is taking his medication without any hypoglycemic symptoms.  Trying to do some exercise.  I will suggest continue same dose of medication medication refill.  He is currently in [place] he will follow-up once he is back in [place].  We will see him and do some blood work and adjust the medication if needed.  - glipiZIDE (GLUCOTROL XL) 5 MG 24 hr tablet; Take 1 tablet (5 mg) by mouth daily  Dispense: 90 tablet; Refill: 1  - metFORMIN (GLUCOPHAGE) 1000 MG tablet; TAKE 1 TABLET BY MOUTH TWICE DAILY WITH MEALS  Dispense: 180 tablet; Refill: 0  - Hemoglobin A1c; Future  - Lipid panel reflex to direct LDL Fasting; Future  - Comprehensive metabolic panel; Future    2. Hyperlipidemia LDL goal <100    - Lipid panel reflex to direct LDL Fasting; Future  - Comprehensive metabolic panel; Future    3 months for physcial    Phone call duration:  8 minutes

## 2020-07-13 DIAGNOSIS — E11.9 TYPE 2 DIABETES MELLITUS WITHOUT COMPLICATION, WITHOUT LONG-TERM CURRENT USE OF INSULIN (H): ICD-10-CM

## 2020-07-13 RX ORDER — BLOOD SUGAR DIAGNOSTIC
STRIP MISCELLANEOUS
Qty: 100 STRIP | Refills: 0 | Status: SHIPPED | OUTPATIENT
Start: 2020-07-13

## 2020-07-15 DIAGNOSIS — E78.5 HYPERLIPIDEMIA LDL GOAL <100: ICD-10-CM

## 2020-07-15 RX ORDER — ATORVASTATIN CALCIUM 20 MG/1
TABLET, FILM COATED ORAL
Qty: 90 TABLET | Refills: 3 | OUTPATIENT
Start: 2020-07-15

## 2020-07-22 DIAGNOSIS — I10 HYPERTENSION GOAL BP (BLOOD PRESSURE) < 140/80: ICD-10-CM

## 2020-07-22 RX ORDER — LISINOPRIL 20 MG/1
TABLET ORAL
Qty: 90 TABLET | Refills: 0 | Status: SHIPPED | OUTPATIENT
Start: 2020-07-22 | End: 2020-10-20

## 2020-07-22 NOTE — TELEPHONE ENCOUNTER
Medication is being filled for 1 time refill only due to:  Patient needs to be seen because due for physical exam and labs around 10/1. Shannan Su RN

## 2020-10-03 DIAGNOSIS — E11.9 TYPE 2 DIABETES MELLITUS WITHOUT COMPLICATION, WITHOUT LONG-TERM CURRENT USE OF INSULIN (H): ICD-10-CM

## 2020-10-03 NOTE — LETTER
October 19, 2020      Pancho Costello  [address]  [place] [address]        Dear Pancho,    I care about your health and have reviewed your health plan. I have reviewed your medical conditions, medication list, and lab results and am making recommendations based on this review, to better manage your health.    You are in particular need of attention regarding:  -Wellness (Physical) Visit   -Medications    I am recommending that you:  -Schedule an appointment    Here is a list of Health Maintenance topics that are due now or due soon:  Health Maintenance Due   Topic Date Due     Pneumococcal Vaccine (1 of 1 - PPSV23) 09/17/1962     Hepatitis B Vaccine (1 of 3 - Risk 3-dose series) 09/17/1975     Zoster (Shingles) Vaccine (2 of 3) 03/04/2014     Diptheria Tetanus Pertussis (DTAP/TDAP/TD) Vaccine (3 - Td) 06/11/2019     Diabetic Foot Exam  06/13/2019     A1C Lab  03/24/2020     Cholesterol Lab  07/02/2020     Kidney Microalbumin Urine Test  07/02/2020     Flu Vaccine (1) 09/01/2020     Basic Metabolic Panel  09/12/2020     Preventive Care Visit  09/24/2020       Please call us at [phone] (or use [facility]) to address the above recommendations.     Thank you for trusting [facility] and we appreciate the opportunity to serve you.  We look forward to supporting your healthcare needs in the future.    Healthy Regards,    Chris Zuluaga MD

## 2020-10-04 NOTE — TELEPHONE ENCOUNTER
"Failed dm protocol  Requested Prescriptions   Pending Prescriptions Disp Refills     metFORMIN (GLUCOPHAGE) 1000 MG tablet [Pharmacy Med Name: METFORMIN 1000MG TABLETS] 180 tablet 0     Sig: TAKE 1 TABLET BY MOUTH TWICE DAILY WITH MEALS       Biguanide Agents Failed - 10/3/2020  9:43 AM        Failed - Patient has documented A1c within the specified period of time.     If HgbA1C is 8 or greater, it needs to be on file within the past 3 months.  If less than 8, must be on file within the past 6 months.     Recent Labs   Lab Test 09/24/19  0903   A1C 6.4*             Failed - Patient's CR is NOT>1.4 OR Patient's EGFR is NOT<45 within past 12 mos.     Recent Labs   Lab Test 09/12/19  1141   GFRESTIMATED >90   GFRESTBLACK >90       Recent Labs   Lab Test 09/12/19  1141   CR 0.76             Passed - Patient is age 10 or older        Passed - Patient does NOT have a diagnosis of CHF.        Passed - Medication is active on med list        Passed - Recent (6 mo) or future (30 days) visit within the authorizing provider's specialty     Patient had office visit in the last 6 months or has a visit in the next 30 days with authorizing provider or within the authorizing provider's specialty.  See \"Patient Info\" tab in inbasket, or \"Choose Columns\" in Meds & Orders section of the refill encounter.               glipiZIDE (GLUCOTROL XL) 5 MG 24 hr tablet [Pharmacy Med Name: GLIPIZIDE ER 5MG TABLETS] 90 tablet 1     Sig: TAKE 1 TABLET(5 MG) BY MOUTH DAILY       Sulfonylurea Agents Failed - 10/3/2020  9:43 AM        Failed - Patient has documented A1c within the specified period of time.     If HgbA1C is 8 or greater, it needs to be on file within the past 3 months.  If less than 8, must be on file within the past 6 months.     Recent Labs   Lab Test 09/24/19  0903   A1C 6.4*             Failed - Patient has a recent creatinine (normal) within the past 12 mos.     Recent Labs   Lab Test 09/12/19  1141   CR 0.76       Ok to refill " "medication if creatinine is low          Passed - Medication is active on med list        Passed - Patient is age 18 or older        Passed - Recent (6 mo) or future (30 days) visit within the authorizing provider's specialty     Patient had office visit in the last 6 months or has a visit in the next 30 days with authorizing provider or within the authorizing provider's specialty.  See \"Patient Info\" tab in inbasket, or \"Choose Columns\" in Meds & Orders section of the refill encounter.                 "

## 2020-10-05 RX ORDER — GLIPIZIDE 5 MG/1
TABLET, FILM COATED, EXTENDED RELEASE ORAL
Qty: 30 TABLET | Refills: 0 | Status: SHIPPED | OUTPATIENT
Start: 2020-10-05 | End: 2020-10-27

## 2020-10-05 NOTE — TELEPHONE ENCOUNTER
[employer] message sent.  Need labs and virtual or physical for further refills    .Sydnie EUCEDA    [facility]

## 2020-10-13 NOTE — TELEPHONE ENCOUNTER
[employer] message sent.  Need labs and virtual or physical for further refills     .Sydnie EUCEDA    [facility]

## 2020-11-23 DIAGNOSIS — E78.5 HYPERLIPIDEMIA LDL GOAL <100: ICD-10-CM

## 2020-11-23 RX ORDER — ATORVASTATIN CALCIUM 20 MG/1
20 TABLET, FILM COATED ORAL DAILY
Qty: 30 TABLET | Refills: 0 | Status: SHIPPED | OUTPATIENT
Start: 2020-11-23 | End: 2020-12-22

## 2020-11-23 NOTE — TELEPHONE ENCOUNTER
Routing refill request to provider for review/approval because:  Labs not current:  LDL last done on 7/2/19    Elva MEIER RN  EP Triage

## 2020-12-04 DIAGNOSIS — E78.5 HYPERLIPIDEMIA LDL GOAL <100: ICD-10-CM

## 2020-12-04 DIAGNOSIS — E11.9 TYPE 2 DIABETES MELLITUS WITHOUT COMPLICATION, WITHOUT LONG-TERM CURRENT USE OF INSULIN (H): ICD-10-CM

## 2020-12-04 LAB — HBA1C MFR BLD: 7 % (ref 0–5.6)

## 2020-12-04 PROCEDURE — 80053 COMPREHEN METABOLIC PANEL: CPT | Performed by: FAMILY MEDICINE

## 2020-12-04 PROCEDURE — 36415 COLL VENOUS BLD VENIPUNCTURE: CPT | Performed by: FAMILY MEDICINE

## 2020-12-04 PROCEDURE — 83036 HEMOGLOBIN GLYCOSYLATED A1C: CPT | Performed by: FAMILY MEDICINE

## 2020-12-04 PROCEDURE — 80061 LIPID PANEL: CPT | Performed by: FAMILY MEDICINE

## 2020-12-05 LAB
ALBUMIN SERPL-MCNC: 4 G/DL (ref 3.4–5)
ALP SERPL-CCNC: 76 U/L (ref 40–150)
ALT SERPL W P-5'-P-CCNC: 92 U/L (ref 0–70)
ANION GAP SERPL CALCULATED.3IONS-SCNC: 4 MMOL/L (ref 3–14)
AST SERPL W P-5'-P-CCNC: 45 U/L (ref 0–45)
BILIRUB SERPL-MCNC: 0.7 MG/DL (ref 0.2–1.3)
BUN SERPL-MCNC: 14 MG/DL (ref 7–30)
CALCIUM SERPL-MCNC: 9.6 MG/DL (ref 8.5–10.1)
CHLORIDE SERPL-SCNC: 102 MMOL/L (ref 94–109)
CHOLEST SERPL-MCNC: 155 MG/DL
CO2 SERPL-SCNC: 28 MMOL/L (ref 20–32)
CREAT SERPL-MCNC: 0.85 MG/DL (ref 0.66–1.25)
GFR SERPL CREATININE-BSD FRML MDRD: >90 ML/MIN/{1.73_M2}
GLUCOSE SERPL-MCNC: 141 MG/DL (ref 70–99)
HDLC SERPL-MCNC: 44 MG/DL
LDLC SERPL CALC-MCNC: 96 MG/DL
NONHDLC SERPL-MCNC: 111 MG/DL
POTASSIUM SERPL-SCNC: 4.8 MMOL/L (ref 3.4–5.3)
PROT SERPL-MCNC: 7.9 G/DL (ref 6.8–8.8)
SODIUM SERPL-SCNC: 134 MMOL/L (ref 133–144)
TRIGL SERPL-MCNC: 75 MG/DL

## 2020-12-22 ENCOUNTER — OFFICE VISIT (OUTPATIENT)
Dept: FAMILY MEDICINE | Facility: CLINIC | Age: 65
End: 2020-12-22
Payer: COMMERCIAL

## 2020-12-22 VITALS
WEIGHT: 261 LBS | BODY MASS INDEX: 35.35 KG/M2 | SYSTOLIC BLOOD PRESSURE: 154 MMHG | OXYGEN SATURATION: 97 % | HEART RATE: 92 BPM | TEMPERATURE: 97.3 F | DIASTOLIC BLOOD PRESSURE: 90 MMHG | HEIGHT: 72 IN

## 2020-12-22 DIAGNOSIS — Z00.00 ENCOUNTER FOR ANNUAL PHYSICAL EXAM: ICD-10-CM

## 2020-12-22 DIAGNOSIS — E11.9 TYPE 2 DIABETES MELLITUS WITHOUT COMPLICATION, WITHOUT LONG-TERM CURRENT USE OF INSULIN (H): ICD-10-CM

## 2020-12-22 DIAGNOSIS — E78.5 HYPERLIPIDEMIA LDL GOAL <100: ICD-10-CM

## 2020-12-22 DIAGNOSIS — N52.9 ERECTILE DYSFUNCTION, UNSPECIFIED ERECTILE DYSFUNCTION TYPE: ICD-10-CM

## 2020-12-22 DIAGNOSIS — E66.01 MORBID OBESITY (H): Primary | ICD-10-CM

## 2020-12-22 DIAGNOSIS — Z23 NEED FOR VACCINATION: ICD-10-CM

## 2020-12-22 DIAGNOSIS — Z23 NEED FOR PROPHYLACTIC VACCINATION AND INOCULATION AGAINST INFLUENZA: ICD-10-CM

## 2020-12-22 DIAGNOSIS — I10 HYPERTENSION GOAL BP (BLOOD PRESSURE) < 140/80: ICD-10-CM

## 2020-12-22 PROBLEM — R10.9 ABDOMINAL PAIN: Status: RESOLVED | Noted: 2019-09-12 | Resolved: 2020-12-22

## 2020-12-22 PROBLEM — K43.9 VENTRAL HERNIA: Status: RESOLVED | Noted: 2019-10-01 | Resolved: 2020-12-22

## 2020-12-22 PROCEDURE — 90682 RIV4 VACC RECOMBINANT DNA IM: CPT | Performed by: FAMILY MEDICINE

## 2020-12-22 PROCEDURE — 90471 IMMUNIZATION ADMIN: CPT | Performed by: FAMILY MEDICINE

## 2020-12-22 PROCEDURE — 90750 HZV VACC RECOMBINANT IM: CPT | Performed by: FAMILY MEDICINE

## 2020-12-22 PROCEDURE — 99396 PREV VISIT EST AGE 40-64: CPT | Mod: 25 | Performed by: FAMILY MEDICINE

## 2020-12-22 PROCEDURE — 82043 UR ALBUMIN QUANTITATIVE: CPT | Performed by: FAMILY MEDICINE

## 2020-12-22 PROCEDURE — 99214 OFFICE O/P EST MOD 30 MIN: CPT | Mod: 25 | Performed by: FAMILY MEDICINE

## 2020-12-22 PROCEDURE — 90472 IMMUNIZATION ADMIN EACH ADD: CPT | Performed by: FAMILY MEDICINE

## 2020-12-22 RX ORDER — ATORVASTATIN CALCIUM 20 MG/1
20 TABLET, FILM COATED ORAL DAILY
Qty: 90 TABLET | Refills: 3 | Status: SHIPPED | OUTPATIENT
Start: 2020-12-22 | End: 2021-12-24

## 2020-12-22 RX ORDER — LISINOPRIL 40 MG/1
40 TABLET ORAL DAILY
Qty: 90 TABLET | Refills: 1 | Status: SHIPPED | OUTPATIENT
Start: 2020-12-22 | End: 2021-06-22

## 2020-12-22 RX ORDER — GLIPIZIDE 10 MG/1
10 TABLET, FILM COATED, EXTENDED RELEASE ORAL DAILY
Qty: 90 TABLET | Refills: 1 | Status: SHIPPED | OUTPATIENT
Start: 2020-12-22 | End: 2021-06-22

## 2020-12-22 RX ORDER — SILDENAFIL 50 MG/1
50 TABLET, FILM COATED ORAL DAILY PRN
Qty: 6 TABLET | Refills: 3 | Status: SHIPPED | OUTPATIENT
Start: 2020-12-22

## 2020-12-22 ASSESSMENT — MIFFLIN-ST. JEOR: SCORE: 2006.4

## 2020-12-22 NOTE — PROGRESS NOTES
SUBJECTIVE:   CC: Pancho Costello is an 65 year old male who presents for preventative health visit.       Patient has been advised of split billing requirements and indicates understanding: Yes  Healthy Habits:     Getting at least 3 servings of Calcium per day:  Yes    Bi-annual eye exam:  Yes    Dental care twice a year:  Yes    Sleep apnea or symptoms of sleep apnea:  None    Diet:  Low salt and Diabetic    Frequency of exercise:  4-5 days/week    Duration of exercise:  30-45 minutes    Taking medications regularly:  Yes    Medication side effects:  Other    PHQ-2 Total Score: 0    Additional concerns today:  No          Diabetes Follow-up    How often are you checking your blood sugar? A few times a week  What time of day are you checking your blood sugars (select all that apply)?  Before meals  Have you had any blood sugars above 200?  No  Have you had any blood sugars below 70?  No    What symptoms do you notice when your blood sugar is low?  None    What concerns do you have today about your diabetes? None     Do you have any of these symptoms? (Select all that apply)  Numbness in feet          Hyperlipidemia Follow-Up      Are you regularly taking any medication or supplement to lower your cholesterol?   Yes- Atorvastatin    Are you having muscle aches or other side effects that you think could be caused by your cholesterol lowering medication?  No    Hypertension Follow-up      Do you check your blood pressure regularly outside of the clinic?No    Are you following a low salt diet? Yes    Are your blood pressures ever more than 140 on the top number (systolic) OR more   than 90 on the bottom number (diastolic), for example 140/90? NA    BP Readings from Last 2 Encounters:   12/22/20 (!) 154/90   10/16/19 138/72     Hemoglobin A1C (%)   Date Value   12/04/2020 7.0 (H)   09/24/2019 6.4 (H)     LDL Cholesterol Calculated (mg/dL)   Date Value   12/04/2020 96   07/02/2019 88         Today's PHQ-2 Score:   PHQ-2  ( 1999 Pfizer) 12/22/2020   Q1: Little interest or pleasure in doing things 0   Q2: Feeling down, depressed or hopeless 0   PHQ-2 Score 0   Q1: Little interest or pleasure in doing things Not at all   Q2: Feeling down, depressed or hopeless Not at all   PHQ-2 Score 0       Abuse: Current or Past(Physical, Sexual or Emotional)- No  Do you feel safe in your environment? Yes    : Yes, patient states has an Advance Care Planning document and will bring a copy to the clinic.    Social History     Tobacco Use     Smoking status: Never Smoker     Smokeless tobacco: Never Used   Substance Use Topics     Alcohol use: Yes     Alcohol/week: 0.0 standard drinks         Alcohol Use 12/22/2020   Prescreen: >3 drinks/day or >7 drinks/week? No   Prescreen: >3 drinks/day or >7 drinks/week? -     Last PSA:   PSA   Date Value Ref Range Status   07/14/2017 0.25 0 - 4 ug/L Final     Comment:     Assay Method:  Chemiluminescence using Siemens Vista analyzer       Reviewed orders with patient. Reviewed health maintenance and updated orders accordingly - Yes  Lab work is in process  Labs reviewed in EPIC    Reviewed and updated as needed this visit by clinical staff  Tobacco  Allergies  Meds              Reviewed and updated as needed this visit by Provider                    Review of Systems  CONSTITUTIONAL: NEGATIVE for fever, chills, change in weight  INTEGUMENTARY/SKIN: NEGATIVE for worrisome rashes, moles or lesions  EYES: NEGATIVE for vision changes or irritation  ENT: NEGATIVE for ear, mouth and throat problems  RESP: NEGATIVE for significant cough or SOB  CV: NEGATIVE for chest pain, palpitations or peripheral edema  GI: NEGATIVE for nausea, abdominal pain, heartburn, or change in bowel habits   male: negative for dysuria, hematuria, decreased urinary stream, erectile dysfunction, urethral discharge  MUSCULOSKELETAL: NEGATIVE for significant arthralgias or myalgia  NEURO: NEGATIVE for weakness, dizziness or  "paresthesias  PSYCHIATRIC: NEGATIVE for changes in mood or affect    OBJECTIVE:   BP (!) 154/90   Pulse 92   Temp 97.3  F (36.3  C) (Tympanic)   Ht 1.82 m (5' 11.65\")   Wt 118.4 kg (261 lb)   SpO2 97%   BMI 35.74 kg/m      Physical Exam  GENERAL: healthy, alert and no distress  EYES: Eyes grossly normal to inspection, PERRL and conjunctivae and sclerae normal  HENT: ear canals and TM's normal, nose and mouth without ulcers or lesions  NECK: no adenopathy, no asymmetry, masses, or scars and thyroid normal to palpation  RESP: lungs clear to auscultation - no rales, rhonchi or wheezes  CV: regular rate and rhythm, normal S1 S2, no S3 or S4, no murmur, click or rub, no peripheral edema and peripheral pulses strong  ABDOMEN: soft, nontender, no hepatosplenomegaly, no masses and bowel sounds normal  MS: no gross musculoskeletal defects noted, no edema  SKIN: no suspicious lesions or rashes  NEURO: Normal strength and tone, mentation intact and speech normal  PSYCH: mentation appears normal, affect normal/bright    Diagnostic Test Results:  Labs reviewed in Epic    ASSESSMENT/PLAN:   1. Encounter for annual physical exam      2. Morbid obesity (H)      3. Type 2 diabetes mellitus without complication, without long-term current use of insulin (H)  A1c slight worse.  Advised to continue work on diet and exercise I will increase his glipizide to 10 mg continue same dose of Metformin will follow him up in 6 months for recheck  - metFORMIN (GLUCOPHAGE) 1000 MG tablet; Take 1 tab 2 times a day  Dispense: 180 tablet; Refill: 1  - glipiZIDE (GLUCOTROL XL) 10 MG 24 hr tablet; Take 1 tablet (10 mg) by mouth daily  Dispense: 90 tablet; Refill: 1  - Albumin Random Urine Quantitative with Creat Ratio    4. Hyperlipidemia LDL goal <100    - atorvastatin (LIPITOR) 20 MG tablet; Take 1 tablet (20 mg) by mouth daily  Dispense: 90 tablet; Refill: 3    5. Hypertension goal BP (blood pressure) < 140/80  Blood pressure was elevated " "recheck was also elevated increase lisinopril to 40 mg follow-up in 2 months with nurse only blood pressure check if this continue to be high we may need to add hydrochlorothiazide combination  - lisinopril (ZESTRIL) 40 MG tablet; Take 1 tablet (40 mg) by mouth daily  Dispense: 90 tablet; Refill: 1    6. Erectile dysfunction, unspecified erectile dysfunction type  Medication refilled-   sildenafil (VIAGRA) 50 MG tablet; Take 1 tablet (50 mg) by mouth daily as needed (as needed) 30 min to 4 hrs before sex. Do not use with nitroglycerin, terazosin or doxazosin.  Dispense: 6 tablet; Refill: 3    7. Need for vaccination    - SHINGRIX [99298]    8. Need for prophylactic vaccination and inoculation against influenza    - INFLUENZA QUAD, RECOMBINANT, P-FREE (RIV4) (FLUBLOCK) [18574]    Patient has been advised of split billing requirements and indicates understanding: Yes  COUNSELING:   Reviewed preventive health counseling, as reflected in patient instructions       Regular exercise       Healthy diet/nutrition    Estimated body mass index is 35.74 kg/m  as calculated from the following:    Height as of this encounter: 1.82 m (5' 11.65\").    Weight as of this encounter: 118.4 kg (261 lb).         He reports that he has never smoked. He has never used smokeless tobacco.      Counseling Resources:  ATP IV Guidelines  Pooled Cohorts Equation Calculator  FRAX Risk Assessment  ICSI Preventive Guidelines  Dietary Guidelines for Americans, 2010  [employer] MyPlate  ASA Prophylaxis  Lung CA Screening    Chris Zuluaga MD  [facility]  "

## 2020-12-23 LAB
CREAT UR-MCNC: 157 MG/DL
MICROALBUMIN UR-MCNC: 14 MG/L
MICROALBUMIN/CREAT UR: 8.79 MG/G CR (ref 0–17)

## 2021-01-13 DIAGNOSIS — E11.9 TYPE 2 DIABETES MELLITUS WITHOUT COMPLICATION, WITHOUT LONG-TERM CURRENT USE OF INSULIN (H): ICD-10-CM

## 2021-03-02 ENCOUNTER — ALLIED HEALTH/NURSE VISIT (OUTPATIENT)
Dept: NURSING | Facility: CLINIC | Age: 66
End: 2021-03-02
Payer: COMMERCIAL

## 2021-03-02 DIAGNOSIS — Z23 NEED FOR VACCINATION: Primary | ICD-10-CM

## 2021-03-02 PROCEDURE — 90471 IMMUNIZATION ADMIN: CPT

## 2021-03-02 PROCEDURE — 90750 HZV VACC RECOMBINANT IM: CPT

## 2021-03-02 PROCEDURE — 90714 TD VACC NO PRESV 7 YRS+ IM: CPT

## 2021-03-02 PROCEDURE — 90472 IMMUNIZATION ADMIN EACH ADD: CPT

## 2021-03-02 PROCEDURE — 99207 PR NO CHARGE LOS: CPT

## 2021-04-16 ENCOUNTER — TELEPHONE (OUTPATIENT)
Dept: FAMILY MEDICINE | Facility: CLINIC | Age: 66
End: 2021-04-16

## 2021-04-16 NOTE — TELEPHONE ENCOUNTER
Patient Quality Outreach      Summary:    Patient has the following on his problem list/HM:   Diabetes    Last A1C:  Lab Results   Component Value Date    A1C 7.0 12/04/2020    A1C 6.4 09/24/2019       Last LDL:    Lab Results   Component Value Date    LDL 96 12/04/2020           Medications     HMG CoA Reductase Inhibitors     atorvastatin (LIPITOR) 20 MG tablet       Salicylates     aspirin 81 MG tablet             Last three blood pressure readings:  BP Readings from Last 3 Encounters:   12/22/20 (!) 154/90   10/16/19 138/72   10/03/19 122/75            Tobacco Use      Smoking status: Never Smoker      Smokeless tobacco: Never Used          Patient is due/failing the following:   Eye Exam    Type of outreach:    Eye exam 3/16/20, neg retinopathy, sent to abstraction    Questions for provider review:    None                                                                                       Jean VILLEDA CMA

## 2021-04-21 ENCOUNTER — IMMUNIZATION (OUTPATIENT)
Dept: NURSING | Facility: CLINIC | Age: 66
End: 2021-04-21
Payer: COMMERCIAL

## 2021-04-21 PROCEDURE — 0001A PR COVID VAC PFIZER DIL RECON 30 MCG/0.3 ML IM: CPT

## 2021-04-21 PROCEDURE — 91300 PR COVID VAC PFIZER DIL RECON 30 MCG/0.3 ML IM: CPT

## 2021-05-12 ENCOUNTER — IMMUNIZATION (OUTPATIENT)
Dept: NURSING | Facility: CLINIC | Age: 66
End: 2021-05-12
Attending: INTERNAL MEDICINE
Payer: COMMERCIAL

## 2021-05-12 PROCEDURE — 91300 PR COVID VAC PFIZER DIL RECON 30 MCG/0.3 ML IM: CPT

## 2021-05-12 PROCEDURE — 0002A PR COVID VAC PFIZER DIL RECON 30 MCG/0.3 ML IM: CPT

## 2021-05-18 ENCOUNTER — TRANSFERRED RECORDS (OUTPATIENT)
Dept: HEALTH INFORMATION MANAGEMENT | Facility: CLINIC | Age: 66
End: 2021-05-18

## 2021-05-18 LAB — RETINOPATHY: NEGATIVE

## 2021-06-22 ENCOUNTER — OFFICE VISIT (OUTPATIENT)
Dept: FAMILY MEDICINE | Facility: CLINIC | Age: 66
End: 2021-06-22
Payer: COMMERCIAL

## 2021-06-22 VITALS
OXYGEN SATURATION: 96 % | RESPIRATION RATE: 16 BRPM | BODY MASS INDEX: 34.67 KG/M2 | WEIGHT: 256 LBS | HEART RATE: 83 BPM | DIASTOLIC BLOOD PRESSURE: 70 MMHG | SYSTOLIC BLOOD PRESSURE: 134 MMHG | TEMPERATURE: 97.9 F | HEIGHT: 72 IN

## 2021-06-22 DIAGNOSIS — E66.01 MORBID OBESITY (H): ICD-10-CM

## 2021-06-22 DIAGNOSIS — E11.9 TYPE 2 DIABETES MELLITUS WITHOUT COMPLICATION, WITHOUT LONG-TERM CURRENT USE OF INSULIN (H): Primary | ICD-10-CM

## 2021-06-22 DIAGNOSIS — I10 HYPERTENSION GOAL BP (BLOOD PRESSURE) < 140/80: ICD-10-CM

## 2021-06-22 DIAGNOSIS — E78.5 HYPERLIPIDEMIA LDL GOAL <100: ICD-10-CM

## 2021-06-22 LAB — HBA1C MFR BLD: 6.3 % (ref 0–5.6)

## 2021-06-22 PROCEDURE — 83036 HEMOGLOBIN GLYCOSYLATED A1C: CPT | Performed by: FAMILY MEDICINE

## 2021-06-22 PROCEDURE — 36415 COLL VENOUS BLD VENIPUNCTURE: CPT | Performed by: FAMILY MEDICINE

## 2021-06-22 PROCEDURE — 99214 OFFICE O/P EST MOD 30 MIN: CPT | Performed by: FAMILY MEDICINE

## 2021-06-22 RX ORDER — LISINOPRIL 40 MG/1
40 TABLET ORAL DAILY
Qty: 90 TABLET | Refills: 1 | Status: SHIPPED | OUTPATIENT
Start: 2021-06-22 | End: 2021-12-20

## 2021-06-22 RX ORDER — GLIPIZIDE 10 MG/1
10 TABLET, FILM COATED, EXTENDED RELEASE ORAL DAILY
Qty: 90 TABLET | Refills: 1 | Status: SHIPPED | OUTPATIENT
Start: 2021-06-22 | End: 2021-12-20

## 2021-06-22 ASSESSMENT — MIFFLIN-ST. JEOR: SCORE: 1982.86

## 2021-06-22 NOTE — PROGRESS NOTES
"    Assessment & Plan     Type 2 diabetes mellitus without complication, without long-term current use of insulin (H)  Patient hemoglobin A1c is stable medication refilled, follow-up in 6 months for physical.  - HEMOGLOBIN A1C  - glipiZIDE (GLUCOTROL XL) 10 MG 24 hr tablet; Take 1 tablet (10 mg) by mouth daily  - metFORMIN (GLUCOPHAGE) 1000 MG tablet; TAKE 1 TABLET BY MOUTH TWICE DAILY WITH MEALS    Hyperlipidemia LDL goal <100      Hypertension goal BP (blood pressure) < 140/80    - lisinopril (ZESTRIL) 40 MG tablet; Take 1 tablet (40 mg) by mouth daily    Morbid obesity (H)             BMI:   Estimated body mass index is 35.11 kg/m  as calculated from the following:    Height as of this encounter: 1.819 m (5' 11.6\").    Weight as of this encounter: 116.1 kg (256 lb).         Chris Zuluaga MD  [facility] [place] Bill is a 65 year old who presents for the following health issues    HPI     Diabetes Follow-up    How often are you checking your blood sugar? A few times a week  What time of day are you checking your blood sugars (select all that apply)?  Before meals, in am  Have you had any blood sugars above 200?  No  Have you had any blood sugars below 70?  No    What symptoms do you notice when your blood sugar is low?  None    What concerns do you have today about your diabetes? None     Do you have any of these symptoms? (Select all that apply)  Numbness in feet      BP Readings from Last 2 Encounters:   06/22/21 134/70   12/22/20 (!) 154/90     Hemoglobin A1C (%)   Date Value   06/22/2021 6.3 (H)   12/04/2020 7.0 (H)     LDL Cholesterol Calculated (mg/dL)   Date Value   12/04/2020 96   07/02/2019 88                 How many servings of fruits and vegetables do you eat daily?  4 or more    On average, how many sweetened beverages do you drink each day (Examples: soda, juice, sweet tea, etc.  Do NOT count diet or artificially sweetened beverages)?   0    How many days per week do " "you exercise enough to make your heart beat faster? 5    How many minutes a day do you exercise enough to make your heart beat faster? 30 - 60    How many days per week do you miss taking your medication? 0        Review of Systems   Constitutional, HEENT, cardiovascular, pulmonary, gi and gu systems are negative, except as otherwise noted.      Objective    /70   Pulse 83   Temp 97.9  F (36.6  C)   Resp 16   Ht 1.819 m (5' 11.6\")   Wt 116.1 kg (256 lb)   SpO2 96%   BMI 35.11 kg/m    Body mass index is 35.11 kg/m .  Physical Exam   GENERAL: healthy, alert and no distress  NECK: no adenopathy, no asymmetry, masses, or scars and thyroid normal to palpation  RESP: lungs clear to auscultation - no rales, rhonchi or wheezes  CV: regular rate and rhythm, normal S1 S2, no S3 or S4, no murmur, click or rub, no peripheral edema and peripheral pulses strong  ABDOMEN: soft, nontender, no hepatosplenomegaly, no masses and bowel sounds normal  MS: no gross musculoskeletal defects noted, no edema            "

## 2021-10-01 ENCOUNTER — TRANSFERRED RECORDS (OUTPATIENT)
Dept: HEALTH INFORMATION MANAGEMENT | Facility: CLINIC | Age: 66
End: 2021-10-01

## 2021-10-01 LAB — RETINOPATHY: NORMAL

## 2021-10-14 ENCOUNTER — TELEPHONE (OUTPATIENT)
Dept: FAMILY MEDICINE | Facility: CLINIC | Age: 66
End: 2021-10-14

## 2021-10-14 NOTE — LETTER
October 14, 2021      Pancho Costello  [id number]  [place] [address]        Dear Pancho,    I care about your health and have reviewed your health plan. I have reviewed your medical conditions, medication list, and lab results and am making recommendations based on this review, to better manage your health.    You are in particular need of attention regarding:  -Diabetes  -Wellness (Physical) Visit     I am recommending that you:  -schedule a WELLNESS (Physical) APPOINTMENT with me.   I will check fasting labs the same day - nothing to eat except water and meds for 8-10 hours prior.    Here is a list of Health Maintenance topics that are due now or due soon:  Health Maintenance Due   Topic Date Due     ANNUAL REVIEW OF HM ORDERS  Never done     Pneumococcal Vaccine (1 of 2 - PPSV23) Never done     Pneumococcal Vaccine (1 of 2 - PPSV23) Never done     Flu Vaccine (1) 09/01/2021     FALL RISK ASSESSMENT  Never done       Please call us at [phone] (or use [employer]) to address the above recommendations.     Thank you for trusting [facility] and we appreciate the opportunity to serve you.  We look forward to supporting your healthcare needs in the future.    Healthy Regards,    Chris Zuluaga MD

## 2021-10-14 NOTE — TELEPHONE ENCOUNTER
Patient Quality Outreach      Summary:    Patient has the following on his problem list/HM: None    Patient is due/failing the following:   Diabetes -  A1C, Microalbumin and Statin  Physical  - Due after 12/22/2021    Type of outreach:    Sent [employer] message.    Questions for provider review:    None                                                                                                                                     Elvie Del Valle CMA       Chart routed to Care Team.

## 2021-10-24 ENCOUNTER — HEALTH MAINTENANCE LETTER (OUTPATIENT)
Age: 66
End: 2021-10-24

## 2021-12-17 DIAGNOSIS — E11.9 TYPE 2 DIABETES MELLITUS WITHOUT COMPLICATION, WITHOUT LONG-TERM CURRENT USE OF INSULIN (H): ICD-10-CM

## 2021-12-17 DIAGNOSIS — I10 HYPERTENSION GOAL BP (BLOOD PRESSURE) < 140/80: ICD-10-CM

## 2021-12-20 RX ORDER — LISINOPRIL 40 MG/1
TABLET ORAL
Qty: 90 TABLET | Refills: 1 | Status: SHIPPED | OUTPATIENT
Start: 2021-12-20 | End: 2022-06-16

## 2021-12-20 RX ORDER — GLIPIZIDE 10 MG/1
TABLET, FILM COATED, EXTENDED RELEASE ORAL
Qty: 90 TABLET | Refills: 1 | Status: SHIPPED | OUTPATIENT
Start: 2021-12-20 | End: 2022-06-16

## 2021-12-20 NOTE — TELEPHONE ENCOUNTER
Routing refill request to provider for review/approval because:  Labs not current:  James MEIER RN  EP Triage

## 2021-12-23 DIAGNOSIS — E78.5 HYPERLIPIDEMIA LDL GOAL <100: ICD-10-CM

## 2021-12-24 RX ORDER — ATORVASTATIN CALCIUM 20 MG/1
TABLET, FILM COATED ORAL
Qty: 90 TABLET | Refills: 0 | Status: SHIPPED | OUTPATIENT
Start: 2021-12-24 | End: 2022-03-24

## 2021-12-24 NOTE — TELEPHONE ENCOUNTER
Prescription approved per [facility] Refill Protocol.    Elva MEIER RN  EP Triage

## 2022-01-10 ASSESSMENT — ENCOUNTER SYMPTOMS
ABDOMINAL PAIN: 0
JOINT SWELLING: 0
CONSTIPATION: 0
FEVER: 0
FREQUENCY: 0
NERVOUS/ANXIOUS: 0
DIARRHEA: 0
ARTHRALGIAS: 0
SHORTNESS OF BREATH: 0
PALPITATIONS: 0
SORE THROAT: 0
CHILLS: 0
DYSURIA: 0
COUGH: 1
HEMATURIA: 0
HEADACHES: 0
PARESTHESIAS: 0
HEMATOCHEZIA: 0
DIZZINESS: 0
NAUSEA: 0
EYE PAIN: 0
HEARTBURN: 1
MYALGIAS: 0
WEAKNESS: 0

## 2022-01-10 ASSESSMENT — ACTIVITIES OF DAILY LIVING (ADL): CURRENT_FUNCTION: NO ASSISTANCE NEEDED

## 2022-01-11 ENCOUNTER — OFFICE VISIT (OUTPATIENT)
Dept: FAMILY MEDICINE | Facility: CLINIC | Age: 67
End: 2022-01-11
Payer: COMMERCIAL

## 2022-01-11 VITALS
RESPIRATION RATE: 16 BRPM | SYSTOLIC BLOOD PRESSURE: 136 MMHG | HEIGHT: 72 IN | BODY MASS INDEX: 35.62 KG/M2 | OXYGEN SATURATION: 96 % | HEART RATE: 89 BPM | DIASTOLIC BLOOD PRESSURE: 76 MMHG | WEIGHT: 263 LBS | TEMPERATURE: 97.3 F

## 2022-01-11 DIAGNOSIS — E11.9 TYPE 2 DIABETES MELLITUS WITHOUT COMPLICATION, WITHOUT LONG-TERM CURRENT USE OF INSULIN (H): ICD-10-CM

## 2022-01-11 DIAGNOSIS — Z00.00 ENCOUNTER FOR ANNUAL PHYSICAL EXAM: ICD-10-CM

## 2022-01-11 DIAGNOSIS — Z13.220 SCREENING FOR HYPERLIPIDEMIA: Primary | ICD-10-CM

## 2022-01-11 DIAGNOSIS — I10 HYPERTENSION GOAL BP (BLOOD PRESSURE) < 140/80: ICD-10-CM

## 2022-01-11 DIAGNOSIS — Z23 HIGH PRIORITY FOR 2019-NCOV VACCINE: ICD-10-CM

## 2022-01-11 DIAGNOSIS — E66.01 MORBID OBESITY (H): ICD-10-CM

## 2022-01-11 DIAGNOSIS — Z12.5 SCREENING FOR PROSTATE CANCER: ICD-10-CM

## 2022-01-11 LAB
ALBUMIN SERPL-MCNC: 4.2 G/DL (ref 3.4–5)
ALP SERPL-CCNC: 79 U/L (ref 40–150)
ALT SERPL W P-5'-P-CCNC: 138 U/L (ref 0–70)
ANION GAP SERPL CALCULATED.3IONS-SCNC: 7 MMOL/L (ref 3–14)
AST SERPL W P-5'-P-CCNC: 73 U/L (ref 0–45)
BILIRUB SERPL-MCNC: 0.9 MG/DL (ref 0.2–1.3)
BUN SERPL-MCNC: 15 MG/DL (ref 7–30)
CALCIUM SERPL-MCNC: 9.4 MG/DL (ref 8.5–10.1)
CHLORIDE BLD-SCNC: 102 MMOL/L (ref 94–109)
CHOLEST SERPL-MCNC: 154 MG/DL
CO2 SERPL-SCNC: 25 MMOL/L (ref 20–32)
CREAT SERPL-MCNC: 0.85 MG/DL (ref 0.66–1.25)
CREAT UR-MCNC: 218 MG/DL
FASTING STATUS PATIENT QL REPORTED: NORMAL
GFR SERPL CREATININE-BSD FRML MDRD: >90 ML/MIN/1.73M2
GLUCOSE BLD-MCNC: 204 MG/DL (ref 70–99)
HBA1C MFR BLD: 7.4 % (ref 0–5.6)
HDLC SERPL-MCNC: 45 MG/DL
LDLC SERPL CALC-MCNC: 89 MG/DL
MICROALBUMIN UR-MCNC: 21 MG/L
MICROALBUMIN/CREAT UR: 9.63 MG/G CR (ref 0–17)
NONHDLC SERPL-MCNC: 109 MG/DL
POTASSIUM BLD-SCNC: 4.4 MMOL/L (ref 3.4–5.3)
PROT SERPL-MCNC: 8.2 G/DL (ref 6.8–8.8)
PSA SERPL-MCNC: 0.36 UG/L (ref 0–4)
SODIUM SERPL-SCNC: 134 MMOL/L (ref 133–144)
TRIGL SERPL-MCNC: 101 MG/DL

## 2022-01-11 PROCEDURE — 90662 IIV NO PRSV INCREASED AG IM: CPT | Performed by: FAMILY MEDICINE

## 2022-01-11 PROCEDURE — 0004A COVID-19,PF,PFIZER (12+ YRS): CPT | Performed by: FAMILY MEDICINE

## 2022-01-11 PROCEDURE — 90471 IMMUNIZATION ADMIN: CPT | Performed by: FAMILY MEDICINE

## 2022-01-11 PROCEDURE — 82043 UR ALBUMIN QUANTITATIVE: CPT | Performed by: FAMILY MEDICINE

## 2022-01-11 PROCEDURE — G0402 INITIAL PREVENTIVE EXAM: HCPCS | Performed by: FAMILY MEDICINE

## 2022-01-11 PROCEDURE — 83036 HEMOGLOBIN GLYCOSYLATED A1C: CPT | Performed by: FAMILY MEDICINE

## 2022-01-11 PROCEDURE — 99214 OFFICE O/P EST MOD 30 MIN: CPT | Mod: 25 | Performed by: FAMILY MEDICINE

## 2022-01-11 PROCEDURE — G0103 PSA SCREENING: HCPCS | Performed by: FAMILY MEDICINE

## 2022-01-11 PROCEDURE — 80061 LIPID PANEL: CPT | Performed by: FAMILY MEDICINE

## 2022-01-11 PROCEDURE — 80053 COMPREHEN METABOLIC PANEL: CPT | Performed by: FAMILY MEDICINE

## 2022-01-11 PROCEDURE — 36415 COLL VENOUS BLD VENIPUNCTURE: CPT | Performed by: FAMILY MEDICINE

## 2022-01-11 PROCEDURE — 91300 COVID-19,PF,PFIZER (12+ YRS): CPT | Performed by: FAMILY MEDICINE

## 2022-01-11 ASSESSMENT — ACTIVITIES OF DAILY LIVING (ADL): CURRENT_FUNCTION: NO ASSISTANCE NEEDED

## 2022-01-11 ASSESSMENT — MIFFLIN-ST. JEOR: SCORE: 2009.61

## 2022-01-11 NOTE — PROGRESS NOTES
"SUBJECTIVE:   Pancho Costello is a 66 year old male who presents for Preventive Visit.    Patient has been advised of split billing requirements and indicates understanding: Yes   Are you in the first 12 months of your Medicare coverage?  Yes,  Visual Acuity:  Right Eye: 20/40   Left Eye: 20/50  Both Eyes: 20/40    Healthy Habits:     In general, how would you rate your overall health?  Good    Frequency of exercise:  4-5 days/week    Duration of exercise:  45-60 minutes    Do you usually eat at least 4 servings of fruit and vegetables a day, include whole grains    & fiber and avoid regularly eating high fat or \"junk\" foods?  Yes    Taking medications regularly:  Yes    Barriers to taking medications:  None    Medication side effects:  None    Ability to successfully perform activities of daily living:  No assistance needed    Home Safety:  Lack of grab bars in the bathroom    Hearing Impairment:  No hearing concerns    In the past 6 months, have you been bothered by leaking of urine?  No    In general, how would you rate your overall mental or emotional health?  Excellent      PHQ-2 Total Score: 0    Additional concerns today:  Yes (Coughing )  Overall stable.  Denies any chest pains no shortness of breath.  He had some coughing spell 3 weeks ago which has improved.  Patient has diabetes which was well controlled in the past but lately his blood numbers are slightly elevated.  He is currently on metformin.  His lifestyle was active in [place] but since he moved to [place] he is little more sedentary.  Denies any change    Do you feel safe in your environment? Yes    Have you ever done Advance Care Planning? (For example, a Health Directive, POLST, or a discussion with a medical provider or your loved ones about your wishes): No, advance care planning information given to patient to review.  Advanced care planning was discussed at today's visit.      Fall risk  Fallen 2 or more times in the past year?: No  Any fall " with injury in the past year?: No  click delete button to remove this line now  Cognitive Screening   1) Repeat 3 items (Leader, Season, Table)    2) Clock draw: NORMAL  3) 3 item recall: Recalls 3 objects  Results: 3 items recalled: COGNITIVE IMPAIRMENT LESS LIKELY    Mini-CogTM Copyright [employer] Licensed by the author for use in [facility]; reprinted with permission (xiao@[facility]). All rights reserved.      Do you have sleep apnea, excessive snoring or daytime drowsiness?: no    Reviewed and updated as needed this visit by clinical staff  Tobacco  Allergies  Meds             Reviewed and updated as needed this visit by Provider               Social History     Tobacco Use     Smoking status: Never Smoker     Smokeless tobacco: Never Used   Substance Use Topics     Alcohol use: Yes     Alcohol/week: 0.0 standard drinks     If you drink alcohol do you typically have >3 drinks per day or >7 drinks per week? No    No flowsheet data found.        Current providers sharing in care for this patient include:   Patient Care Team:  Chris Zuluaga MD as PCP - General (Family Practice)  Chris Zuluaga MD as Assigned PCP    The following health maintenance items are reviewed in Epic and correct as of today:  Health Maintenance Due   Topic Date Due     ANNUAL REVIEW OF HM ORDERS  Never done     INFLUENZA VACCINE (1) 09/01/2021     FALL RISK ASSESSMENT  Never done     Pneumococcal Vaccine: Pediatrics (0 to 5 Years) and At-Risk Patients (6 to 64 Years) (1 of 1 - PPSV23) Never done     COVID-19 Vaccine (3 - Booster for Pfizer series) 11/12/2021     BMP  12/04/2021     LIPID  12/04/2021     A1C  12/22/2021     MICROALBUMIN  12/22/2021     DIABETIC FOOT EXAM  12/22/2021     Pneumococcal Vaccine: 65+ Years (1 of 1 - PPSV23) 09/17/2021             Review of Systems  CONSTITUTIONAL: NEGATIVE for fever, chills, change in weight  INTEGUMENTARY/SKIN: NEGATIVE for worrisome rashes, moles or lesions  EYES: NEGATIVE for vision  "changes or irritation  ENT/MOUTH: NEGATIVE for ear, mouth and throat problems  RESP: NEGATIVE for significant cough or SOB  BREAST: NEGATIVE for masses, tenderness or discharge  CV: NEGATIVE for chest pain, palpitations or peripheral edema  GI: NEGATIVE for nausea, abdominal pain, heartburn, or change in bowel habits  : NEGATIVE for frequency, dysuria, or hematuria  MUSCULOSKELETAL: NEGATIVE for significant arthralgias or myalgia  NEURO: NEGATIVE for weakness, dizziness or paresthesias  ENDOCRINE: NEGATIVE for temperature intolerance, skin/hair changes  HEME: NEGATIVE for bleeding problems  PSYCHIATRIC: NEGATIVE for changes in mood or affect    OBJECTIVE:   BP (!) 144/78   Pulse 89   Temp 97.3  F (36.3  C) (Tympanic)   Resp 16   Ht 1.819 m (5' 11.6\")   Wt 119.3 kg (263 lb)   SpO2 96%   BMI 36.07 kg/m   Estimated body mass index is 36.07 kg/m  as calculated from the following:    Height as of this encounter: 1.819 m (5' 11.6\").    Weight as of this encounter: 119.3 kg (263 lb).  Physical Exam  GENERAL: healthy, alert and no distress  EYES: Eyes grossly normal to inspection, PERRL and conjunctivae and sclerae normal  HENT: ear canals and TM's normal, nose and mouth without ulcers or lesions  NECK: no adenopathy, no asymmetry, masses, or scars and thyroid normal to palpation  RESP: lungs clear to auscultation - no rales, rhonchi or wheezes  CV: regular rate and rhythm, normal S1 S2, no S3 or S4, no murmur, click or rub, no peripheral edema and peripheral pulses strong  ABDOMEN: soft, nontender, no hepatosplenomegaly, no masses and bowel sounds normal  MS: no gross musculoskeletal defects noted, no edema  SKIN: no suspicious lesions or rashes  NEURO: Normal strength and tone, mentation intact and speech normal  PSYCH: mentation appears normal, affect normal/bright        ASSESSMENT / PLAN:   Pancho was seen today for physical and imm/inj.    Diagnoses and all orders for this visit:    Screening for " "hyperlipidemia  -     Lipid panel reflex to direct LDL Fasting; Future  -     Comprehensive metabolic panel (BMP + Alb, Alk Phos, ALT, AST, Total. Bili, TP); Future  -     Lipid panel reflex to direct LDL Fasting  -     Comprehensive metabolic panel (BMP + Alb, Alk Phos, ALT, AST, Total. Bili, TP)    Type 2 diabetes mellitus without complication, without long-term current use of insulin (H)  -     HEMOGLOBIN A1C; Future  -     Albumin Random Urine Quantitative with Creat Ratio; Future  -     Comprehensive metabolic panel (BMP + Alb, Alk Phos, ALT, AST, Total. Bili, TP); Future  -     HEMOGLOBIN A1C  -     Albumin Random Urine Quantitative with Creat Ratio  -     Comprehensive metabolic panel (BMP + Alb, Alk Phos, ALT, AST, Total. Bili, TP)  Patient hemoglobin A1c slight versus could be sedentary lifestyle or dietary issues.  Suggested to change those issues and see if that helps.  He will let us know in 2 to 3 months how his numbers are if they are continue to be elevated we may need to adjust the medication otherwise follow-up in 6 months for recheck.  Hypertension goal BP (blood pressure) < 140/80  Blood pressure well controlled recheck was better.  Encounter for annual physical exam  Labs ordered once done we will follow-up on the  Morbid obesity (H)    Screening for prostate cancer  -     PSA, screen; Future  -     PSA, screen    High priority for 2019-nCoV vaccine  -     COVID-19,PF,PFIZER (12+ Yrs PURPLE LABEL)    Other orders  -     REVIEW OF HEALTH MAINTENANCE PROTOCOL ORDERS  -     VT FLU VACCINE, INCREASED ANTIGEN, PRESV FREE ([id number])        Patient has been advised of split billing requirements and indicates understanding: Yes  COUNSELING:  Reviewed preventive health counseling, as reflected in patient instructions       Regular exercise       Healthy diet/nutrition    Estimated body mass index is 36.07 kg/m  as calculated from the following:    Height as of this encounter: 1.819 m (5' 11.6\").    Weight " as of this encounter: 119.3 kg (263 lb).        He reports that he has never smoked. He has never used smokeless tobacco.      Appropriate preventive services were discussed with this patient, including applicable screening as appropriate for cardiovascular disease, diabetes, osteopenia/osteoporosis, and glaucoma.  As appropriate for age/gender, discussed screening for colorectal cancer, prostate cancer, breast cancer, and cervical cancer. Checklist reviewing preventive services available has been given to the patient.    Reviewed patients plan of care and provided an AVS. The Basic Care Plan (routine screening as documented in Health Maintenance) for Pancho meets the Care Plan requirement. This Care Plan has been established and reviewed with the Patient.    Counseling Resources:  ATP IV Guidelines  Pooled Cohorts Equation Calculator  Breast Cancer Risk Calculator  Breast Cancer: Medication to Reduce Risk  FRAX Risk Assessment  ICSI Preventive Guidelines  Dietary Guidelines for Americans, 2010  USDA's MyPlate  ASA Prophylaxis  Lung CA Screening    Chris Zuluaga MD  [facility]    Identified Health Risks:

## 2022-03-24 DIAGNOSIS — E78.5 HYPERLIPIDEMIA LDL GOAL <100: ICD-10-CM

## 2022-03-24 RX ORDER — ATORVASTATIN CALCIUM 20 MG/1
TABLET, FILM COATED ORAL
Qty: 90 TABLET | Refills: 0 | Status: SHIPPED | OUTPATIENT
Start: 2022-03-24 | End: 2022-06-16

## 2022-05-27 NOTE — PROGRESS NOTES
First postoperative visit after laparoscopic repair of incarcerated hernia.  Slow but definitive improvement.  He is now looking forward to increasing his activity while respecting the weight restriction.  He has continued to work on weight loss.    On exam his incisions have healed nicely without evidence of infection or hernia.    Reviewed the limitations and recommendations for the next several weeks, he understands    He will return to see us as needed.    Questions please call me.    Best regards.    Please route or send letter to:  Primary Care Provider (PCP), Referring Provider, Include Op Note and Include Progress Note         No

## 2022-07-31 ENCOUNTER — HEALTH MAINTENANCE LETTER (OUTPATIENT)
Age: 67
End: 2022-07-31

## 2022-09-22 ENCOUNTER — OFFICE VISIT (OUTPATIENT)
Dept: FAMILY MEDICINE | Facility: CLINIC | Age: 67
End: 2022-09-22
Payer: COMMERCIAL

## 2022-09-22 VITALS
HEART RATE: 93 BPM | DIASTOLIC BLOOD PRESSURE: 80 MMHG | WEIGHT: 263.2 LBS | SYSTOLIC BLOOD PRESSURE: 146 MMHG | TEMPERATURE: 97.2 F | HEIGHT: 71 IN | BODY MASS INDEX: 36.85 KG/M2 | RESPIRATION RATE: 16 BRPM | OXYGEN SATURATION: 97 %

## 2022-09-22 DIAGNOSIS — G47.30 SLEEP APNEA, UNSPECIFIED TYPE: ICD-10-CM

## 2022-09-22 DIAGNOSIS — E66.01 MORBID OBESITY (H): ICD-10-CM

## 2022-09-22 DIAGNOSIS — E11.9 TYPE 2 DIABETES MELLITUS WITHOUT COMPLICATION, WITHOUT LONG-TERM CURRENT USE OF INSULIN (H): Primary | ICD-10-CM

## 2022-09-22 DIAGNOSIS — E78.5 HYPERLIPIDEMIA LDL GOAL <100: ICD-10-CM

## 2022-09-22 DIAGNOSIS — R79.89 ELEVATED LIVER FUNCTION TESTS: ICD-10-CM

## 2022-09-22 DIAGNOSIS — I10 HYPERTENSION GOAL BP (BLOOD PRESSURE) < 140/80: ICD-10-CM

## 2022-09-22 LAB
ALBUMIN SERPL-MCNC: 3.9 G/DL (ref 3.4–5)
ALP SERPL-CCNC: 81 U/L (ref 40–150)
ALT SERPL W P-5'-P-CCNC: 68 U/L (ref 0–70)
ANION GAP SERPL CALCULATED.3IONS-SCNC: 5 MMOL/L (ref 3–14)
AST SERPL W P-5'-P-CCNC: 37 U/L (ref 0–45)
BILIRUB SERPL-MCNC: 0.6 MG/DL (ref 0.2–1.3)
BUN SERPL-MCNC: 15 MG/DL (ref 7–30)
CALCIUM SERPL-MCNC: 9.6 MG/DL (ref 8.5–10.1)
CHLORIDE BLD-SCNC: 104 MMOL/L (ref 94–109)
CO2 SERPL-SCNC: 27 MMOL/L (ref 20–32)
CREAT SERPL-MCNC: 0.93 MG/DL (ref 0.66–1.25)
GFR SERPL CREATININE-BSD FRML MDRD: >90 ML/MIN/1.73M2
GLUCOSE BLD-MCNC: 212 MG/DL (ref 70–99)
HBA1C MFR BLD: 7.2 % (ref 0–5.6)
POTASSIUM BLD-SCNC: 5 MMOL/L (ref 3.4–5.3)
PROT SERPL-MCNC: 7.9 G/DL (ref 6.8–8.8)
SODIUM SERPL-SCNC: 136 MMOL/L (ref 133–144)

## 2022-09-22 PROCEDURE — 83036 HEMOGLOBIN GLYCOSYLATED A1C: CPT | Performed by: FAMILY MEDICINE

## 2022-09-22 PROCEDURE — 80053 COMPREHEN METABOLIC PANEL: CPT | Performed by: FAMILY MEDICINE

## 2022-09-22 PROCEDURE — 99214 OFFICE O/P EST MOD 30 MIN: CPT | Performed by: FAMILY MEDICINE

## 2022-09-22 PROCEDURE — 36415 COLL VENOUS BLD VENIPUNCTURE: CPT | Performed by: FAMILY MEDICINE

## 2022-09-22 RX ORDER — HYDROCHLOROTHIAZIDE 12.5 MG/1
12.5 TABLET ORAL DAILY
Qty: 90 TABLET | Refills: 1 | Status: SHIPPED | OUTPATIENT
Start: 2022-09-22

## 2022-09-22 ASSESSMENT — PAIN SCALES - GENERAL: PAINLEVEL: NO PAIN (0)

## 2022-09-22 NOTE — LETTER
September 23, 2022      Fly Costello  [address] [facility] [address]        Dear ,    We are writing to inform you of your test results.    I have reviewed your recent labs. Here are the results:     -Liver and gallbladder tests are normal (ALT,AST, Alk phos, bilirubin), kidney function is normal (Cr, GFR), sodium is normal, potassium is normal, calcium is normal, glucose is elevated due to diabetes.   -A1C (test of diabetes control the last 2-3 months) is at your goal. Please continue with your current plan. Also, you should make an appointment to see me and recheck your A1C test in 6 months.     Chris Zuluaga MD   9/22/2022     Resulted Orders   HEMOGLOBIN A1C   Result Value Ref Range    Hemoglobin A1C 7.2 (H) 0.0 - 5.6 %      Comment:      Normal <5.7%   Prediabetes 5.7-6.4%    Diabetes 6.5% or higher     Note: Adopted from ADA consensus guidelines.   Comprehensive metabolic panel (BMP + Alb, Alk Phos, ALT, AST, Total. Bili, TP)   Result Value Ref Range    Sodium 136 133 - 144 mmol/L    Potassium 5.0 3.4 - 5.3 mmol/L    Chloride 104 94 - 109 mmol/L    Carbon Dioxide (CO2) 27 20 - 32 mmol/L    Anion Gap 5 3 - 14 mmol/L    Urea Nitrogen 15 7 - 30 mg/dL    Creatinine 0.93 0.66 - 1.25 mg/dL    Calcium 9.6 8.5 - 10.1 mg/dL    Glucose 212 (H) 70 - 99 mg/dL    Alkaline Phosphatase 81 40 - 150 U/L    AST 37 0 - 45 U/L    ALT 68 0 - 70 U/L    Protein Total 7.9 6.8 - 8.8 g/dL    Albumin 3.9 3.4 - 5.0 g/dL    Bilirubin Total 0.6 0.2 - 1.3 mg/dL    GFR Estimate >90 >60 mL/min/1.73m2      Comment:      Effective December 21, 2021 eGFRcr in adults is calculated using the 2021 CKD-EPI creatinine equation which includes age and gender (Matilda et al., NEJM, DOI: 10.1056/VBYFys9244092)       If you have any questions or concerns, please call the clinic at the number listed above.       Sincerely,      Chris Zuluaga MD

## 2022-09-22 NOTE — PROGRESS NOTES
"  Assessment & Plan     Type 2 diabetes mellitus without complication, without long-term current use of insulin (H)  Patient hemoglobin A1c is slightly elevated though improved from previous.  We will keep you on the same dose work on losing weight and exercise.  Eat healthy follow-up in 6 months for recheck  - HEMOGLOBIN A1C; Future  - Comprehensive metabolic panel (BMP + Alb, Alk Phos, ALT, AST, Total. Bili, TP); Future  - HEMOGLOBIN A1C  - Comprehensive metabolic panel (BMP + Alb, Alk Phos, ALT, AST, Total. Bili, TP)    Hypertension goal BP (blood pressure) < 140/80  Blood pressure elevated recheck was also high.  We will add hydrochlorothiazide to his regimen follow-up in 3 months or 6 months for recheck low-salt diet exercise discussed with  - Comprehensive metabolic panel (BMP + Alb, Alk Phos, ALT, AST, Total. Bili, TP); Future  - Comprehensive metabolic panel (BMP + Alb, Alk Phos, ALT, AST, Total. Bili, TP)  - hydrochlorothiazide (HYDRODIURIL) 12.5 MG tablet; Take 1 tablet (12.5 mg) by mouth daily    Hyperlipidemia LDL goal <100      Elevated liver function tests  Mildly elevated liver function reviewed, discussed fatty liver along with obesity can be the cause of it.  Suggested better control of blood sugar and exercise will help.  We will recheck and follow-up on that if continue to be worse and may need ultrasound.  - Comprehensive metabolic panel (BMP + Alb, Alk Phos, ALT, AST, Total. Bili, TP); Future  - Comprehensive metabolic panel (BMP + Alb, Alk Phos, ALT, AST, Total. Bili, TP)    Morbid obesity (H)      Sleep apnea, unspecified type  Some issue with sleep apnea and snoring suggested sleep evaluation referral provided  - Adult Sleep Eval & Management  Referral; Future    57}     BMI:   Estimated body mass index is 36.71 kg/m  as calculated from the following:    Height as of this encounter: 1.803 m (5' 11\").    Weight as of this encounter: 119.4 kg (263 lb 3.2 oz).           Return in about " "6 months (around 3/22/2023) for Physical Exam.    Chris Zuluaga MD  [facility] [place] Bill is a 67 year old, presenting for the following health issues:  Diabetes      History of Present Illness       Diabetes:   He presents for follow up of diabetes.  He is checking home blood glucose one time daily. He checks blood glucose before meals.  Blood glucose is never over 200 and never under 70. When his blood glucose is low, the patient is asymptomatic for confusion, blurred vision, lethargy and reports not feeling dizzy, shaky, or weak.  He has no concerns regarding his diabetes at this time.  He is having numbness in feet. The patient has had a diabetic eye exam in the last 12 months. Eye exam performed on 10/1/21. Location of last eye exam [facility].    He consumes 0 sweetened beverage(s) daily.He exercises with enough effort to increase his heart rate 30 to 60 minutes per day.  He exercises with enough effort to increase his heart rate 6 days per week.   He is taking medications regularly.           Review of Systems   Constitutional, HEENT, cardiovascular, pulmonary, gi and gu systems are negative, except as otherwise noted.      Objective    BP (!) 146/80   Pulse 93   Temp 97.2  F (36.2  C) (Tympanic)   Resp 16   Ht 1.803 m (5' 11\")   Wt 119.4 kg (263 lb 3.2 oz)   SpO2 97%   BMI 36.71 kg/m    Body mass index is 36.71 kg/m .  Physical Exam   GENERAL: healthy, alert and no distress  NECK: no adenopathy, no asymmetry, masses, or scars and thyroid normal to palpation  RESP: lungs clear to auscultation - no rales, rhonchi or wheezes  CV: regular rate and rhythm, normal S1 S2, no S3 or S4, no murmur, click or rub, no peripheral edema and peripheral pulses strong  ABDOMEN: soft, nontender, no hepatosplenomegaly, no masses and bowel sounds normal  MS: no gross musculoskeletal defects noted, no edema                "

## 2022-09-26 ENCOUNTER — TRANSFERRED RECORDS (OUTPATIENT)
Dept: HEALTH INFORMATION MANAGEMENT | Facility: CLINIC | Age: 67
End: 2022-09-26

## 2022-09-26 LAB — RETINOPATHY: NEGATIVE

## 2022-10-16 ENCOUNTER — HEALTH MAINTENANCE LETTER (OUTPATIENT)
Age: 67
End: 2022-10-16

## 2022-12-12 DIAGNOSIS — I10 HYPERTENSION GOAL BP (BLOOD PRESSURE) < 140/80: ICD-10-CM

## 2022-12-12 DIAGNOSIS — E78.5 HYPERLIPIDEMIA LDL GOAL <100: ICD-10-CM

## 2022-12-12 DIAGNOSIS — E11.9 TYPE 2 DIABETES MELLITUS WITHOUT COMPLICATION, WITHOUT LONG-TERM CURRENT USE OF INSULIN (H): ICD-10-CM

## 2022-12-12 NOTE — LETTER
December 26, 2022      Pancho Costello  [address]  [place] [address]          Dear Mr. Costello,    Our records indicate that it is time to schedule a visit with your primary care provider.  You are due to be seen for a follow-up of medications.  We have sent to the pharmacy a 1 month refill of your medication until you can be seen by your provider.  You may call [phone] to schedule or via [employer] using the appointment tab.  If you are no longer a [facility] patient; please contact us and let us know that as well.  You will need to let the pharmacy know the name of your new provider so that they can send future refill requests to them.    Sincerely,    [facility] [place]

## 2022-12-13 RX ORDER — GLIPIZIDE 10 MG/1
TABLET, FILM COATED, EXTENDED RELEASE ORAL
Qty: 90 TABLET | Refills: 0 | Status: SHIPPED | OUTPATIENT
Start: 2022-12-13 | End: 2023-03-13

## 2022-12-13 RX ORDER — ATORVASTATIN CALCIUM 20 MG/1
TABLET, FILM COATED ORAL
Qty: 90 TABLET | Refills: 1 | Status: SHIPPED | OUTPATIENT
Start: 2022-12-13 | End: 2023-06-12

## 2022-12-14 RX ORDER — LISINOPRIL 40 MG/1
TABLET ORAL
Qty: 90 TABLET | Refills: 0 | Status: SHIPPED | OUTPATIENT
Start: 2022-12-14 | End: 2023-03-13

## 2022-12-14 NOTE — TELEPHONE ENCOUNTER
[facility] message sent  Hipolito Robison, Medical Assistant  [facility]

## 2023-01-13 ASSESSMENT — SLEEP AND FATIGUE QUESTIONNAIRES
HOW LIKELY ARE YOU TO NOD OFF OR FALL ASLEEP WHILE SITTING AND TALKING TO SOMEONE: WOULD NEVER DOZE
HOW LIKELY ARE YOU TO NOD OFF OR FALL ASLEEP WHILE LYING DOWN TO REST IN THE AFTERNOON WHEN CIRCUMSTANCES PERMIT: MODERATE CHANCE OF DOZING
HOW LIKELY ARE YOU TO NOD OFF OR FALL ASLEEP WHILE WATCHING TV: SLIGHT CHANCE OF DOZING
HOW LIKELY ARE YOU TO NOD OFF OR FALL ASLEEP WHILE SITTING AND READING: SLIGHT CHANCE OF DOZING
HOW LIKELY ARE YOU TO NOD OFF OR FALL ASLEEP WHEN YOU ARE A PASSENGER IN A CAR FOR AN HOUR WITHOUT A BREAK: WOULD NEVER DOZE
HOW LIKELY ARE YOU TO NOD OFF OR FALL ASLEEP WHILE SITTING INACTIVE IN A PUBLIC PLACE: SLIGHT CHANCE OF DOZING
HOW LIKELY ARE YOU TO NOD OFF OR FALL ASLEEP WHILE SITTING QUIETLY AFTER LUNCH WITHOUT ALCOHOL: SLIGHT CHANCE OF DOZING
HOW LIKELY ARE YOU TO NOD OFF OR FALL ASLEEP IN A CAR, WHILE STOPPED FOR A FEW MINUTES IN TRAFFIC: WOULD NEVER DOZE

## 2023-01-16 PROBLEM — K43.6 VENTRAL HERNIA WITH BOWEL OBSTRUCTION: Status: RESOLVED | Noted: 2019-09-13 | Resolved: 2023-01-16

## 2023-01-16 PROBLEM — E11.9 TYPE 2 DIABETES MELLITUS WITHOUT COMPLICATION, WITHOUT LONG-TERM CURRENT USE OF INSULIN (H): Chronic | Status: ACTIVE | Noted: 2017-07-14

## 2023-01-16 PROBLEM — K43.6 VENTRAL HERNIA WITH BOWEL OBSTRUCTION: Status: ACTIVE | Noted: 2019-09-13

## 2023-01-16 PROBLEM — E66.01 MORBID OBESITY (H): Chronic | Status: ACTIVE | Noted: 2017-07-14

## 2023-01-17 ENCOUNTER — VIRTUAL VISIT (OUTPATIENT)
Dept: SLEEP MEDICINE | Facility: CLINIC | Age: 68
End: 2023-01-17
Attending: FAMILY MEDICINE
Payer: COMMERCIAL

## 2023-01-17 VITALS — WEIGHT: 260 LBS | BODY MASS INDEX: 34.46 KG/M2 | HEIGHT: 73 IN

## 2023-01-17 DIAGNOSIS — E66.01 MORBID OBESITY (H): Chronic | ICD-10-CM

## 2023-01-17 DIAGNOSIS — R06.83 SNORING: Primary | ICD-10-CM

## 2023-01-17 DIAGNOSIS — E11.9 TYPE 2 DIABETES MELLITUS WITHOUT COMPLICATION, WITHOUT LONG-TERM CURRENT USE OF INSULIN (H): Chronic | ICD-10-CM

## 2023-01-17 DIAGNOSIS — I10 HYPERTENSION GOAL BP (BLOOD PRESSURE) < 140/80: Chronic | ICD-10-CM

## 2023-01-17 DIAGNOSIS — F51.04 CHRONIC INSOMNIA: ICD-10-CM

## 2023-01-17 PROCEDURE — 99204 OFFICE O/P NEW MOD 45 MIN: CPT | Mod: GT | Performed by: INTERNAL MEDICINE

## 2023-01-17 ASSESSMENT — PAIN SCALES - GENERAL: PAINLEVEL: NO PAIN (0)

## 2023-01-17 NOTE — PATIENT INSTRUCTIONS
Read the book Say Good Night To Insomnia            Your BMI is Body mass index is 34.3 kg/m .    What is BMI?  Body mass index (BMI) is one way to tell whether you are at a healthy weight, overweight, or obese. It measures your weight in relation to your height.  A BMI of 18.5 to 24.9 is in the healthy range. A person with a BMI of 25 to 29.9 is considered overweight, and someone with a BMI of 30 or greater is considered obese.  Another way to find out if you are at risk for health problems caused by overweight and obesity is to measure your waist. If you are a woman and your waist is more than 35 inches, or if you are a man and your waist is more than 40 inches, your risk of disease may be higher.  More than two-thirds of American adults are considered overweight or obese. Being overweight or obese increases the risk for further weight gain.  Excess weight may lead to heart disease and diabetes. Creating and following plans for healthy eating and physical activity may help you improve your health.    Methods for maintaining or losing weight.  Weight control is part of healthy lifestyle and includes exercise, emotional health, and healthy eating habits.  Careful eating habits lifelong is the mainstay of weight control.  Though there are significant health benefits from weight loss, long-term weight loss with diet alone may be very difficult to achieve- studies show long-term success with dietary management in less than 10% of people. Attaining a healthy weight may be especially difficult to achieve in those with severe obesity. In some cases, medications, devices and surgical management might be considered.    What can you do?  If you are overweight or obese and are interested in methods for weight loss, you should discuss this with your provider. In addition, we recommend that you review healthy life styles and methods for weight loss available through the National Institutes of Health patient information sites:    http://win.niddk.nih.gov/publications/index.htm

## 2023-01-17 NOTE — PROGRESS NOTES
Fly is a 67 year old who is being evaluated via a billable video visit.      How would you like to obtain your AVS? Clipboardhart  If the video visit is dropped, the invitation should be resent by: Text to cell phone: [phone]  Will anyone else be joining your video visit? Dawna Weiss      Video-Visit Details    Type of service:  Video Visit     Originating Location (pt. Location): Home    Distant Location (provider location):  Off-site  Platform used for Video Visit: Akua

## 2023-01-17 NOTE — PROGRESS NOTES
Outpatient Sleep Medicine Consultation:      Name: Pancho Costello MRN# [id number]   Age: 67 year old YOB: 1955     Date of Consultation: January 17, 2023  Consultation is requested by: Chris Zuluaga MD  [address] [place] [address] Chris Zuluaga  Primary care provider: Chris Zuluaga       Reason for Sleep Consult:     Pancho Costello is sent by Chris Zuluaga for a sleep consultation regarding possible obstructive sleep apnea .    Patient s Reason for visit  Pancho Costello main reason for visit: sleep apnea  Patient states problem(s) started: ongoing for years  Pancho Costello's goals for this visit: find a solution or fix to sleep better           Assessment and Plan:       Socially disruptive snoring, witnessed apneas, snort arousals, sleep maintenance difficulties, nocturia, heartburn at night, obesity, large neck, crowded oropharynx. Comorbid hypertension, diabetes mellitus.   - Patient appears to be a good candidate for Home Sleep Test STOPBANG  7    Sleep onset, maintenance difficulties (SENAIT 18)  Suspect psychophysiologic insomnia and complicated by sleep disordered breathing. We discussed stimulus control, sleep restriction and regular wake times.   - Read the book Say Good Night To Insomnia    - Consider referral for cognitive behavioral training     Obesity  We discussed the link between obesity, sleep apnea   - See patient instructions      Summary Counseling:    Sleep Testing Reviewed  Obstructive Sleep Apnea Reviewed  Complications of Untreated Sleep Apnea Reviewed        I spent 25 minutes with patient including counseling, and 10 minutes with chart review, and documentation     CC: Chris Zuluaga          History of Present Illness:         SLEEP-WAKE SCHEDULE:     Work/School Days: Patient goes to school/work: No   Usually gets into bed at 10-10:30 pm  Takes patient about 10-15 mins to fall asleep  Has trouble falling asleep 1-2 times per week nights per week  Wakes up in the middle of  the night 4-5 times.  Wakes up due to Snorting self awake;Use the bathroom  He has trouble falling back asleep 4-5 times a week due to an over-active mind    This has been a problem 'forever'   It usually takes 30-90 mins to get back to sleep  Patient is usually up at 7:30-8:00 am  Uses alarm: No    Weekends/Non-work Days/All Other Days:  Usually gets into bed at 10:30 pm   Takes patient about 10-15 mins to fall asleep  Patient is usually up at 7:30-8:00 am  Uses alarm: No    Sleep Need  Patient gets  5-6 hours sleep on average   Patient thinks he needs about 8 hours sleep    Pancho Costello prefers to sleep in this position(s): Side   Patient states they do the following activities in bed: Read    Naps  Patient takes a purposeful nap 3-4dys per week times a week and naps are usually 30-60 mins in duration at 1 PM   He feels better after a nap: Yes  He dozes off unintentionally 0 days per week  Patient has had a driving accident or near-miss due to sleepiness/drowsiness:  No      SLEEP DISRUPTIONS:    Breathing/Snoring  Patient snores:Yes, has a bed partner  Other people complain about his snoring: Yes  Patient has been told he stops breathing in his sleep:Yes  He has issues with the following: Morning mouth dryness;Heartburn or reflux at night;Getting up to urinate more than once    Movement:  Patient gets pain, discomfort, with an urge to move:  No  It happens when he is resting:  No  It happens more at night:  No  Patient has been told he kicks his legs at night:  No     Behaviors in Sleep:  Pancho Costello has experienced the following behaviors while sleeping:   Recurring Nightmares; in past   Sleep-talking  He has experienced sudden muscle weakness during the day: Yes      CAFFEINE AND OTHER SUBSTANCES:    Patient consumes caffeinated beverages per day:  1 cup coffee in morning - maybe 6oz of diet coke 1 or 2 per week  Last caffeine use is usually: 9:00 am  List of any prescribed or over the counter stimulants  that patient takes: none  List of any prescribed or over the counter sleep medication patient takes: none  List of previous sleep medications that patient has tried: melatonin, NyQuil  Patient drinks alcohol to help them sleep: No  Patient drinks alcohol near bedtime: Yes        SCALES:    EPWORTH SLEEPINESS SCALE      [place] Sleepiness Scale ( KARINE Brunson  [id number]<br>ESS - [place]/English - Final version - 21 Nov 07 - [facility] [place].) 1/13/2023   Sitting and reading Slight chance of dozing   Watching TV Slight chance of dozing   Sitting, inactive in a public place (e.g. a theatre or a meeting) Slight chance of dozing   As a passenger in a car for an hour without a break Would never doze   Lying down to rest in the afternoon when circumstances permit Moderate chance of dozing   Sitting and talking to someone Would never doze   Sitting quietly after a lunch without alcohol Slight chance of dozing   In a car, while stopped for a few minutes in traffic Would never doze   [place] Score (MC) 6   [place] Score (Sleep) 6         INSOMNIA SEVERITY INDEX (SENAIT)      Insomnia Severity Index (SENAIT) 1/13/2023   Difficulty falling asleep 2   Difficulty staying asleep 3   Problems waking up too early 3   How SATISFIED/DISSATISFIED are you with your CURRENT sleep pattern? 3   How NOTICEABLE to others do you think your sleep problem is in terms of impairing the quality of your life? 1   How WORRIED/DISTRESSED are you about your current sleep problem? 3   To what extent do you consider your sleep problem to INTERFERE with your daily functioning (e.g. daytime fatigue, mood, ability to function at work/daily chores, concentration, memory, mood, etc.) CURRENTLY? 3   SENAIT Total Score 18       Guidelines for Scoring/Interpretation:  Total score categories:  0-7 = No clinically significant insomnia   8-14 = Subthreshold insomnia   15-21 = Clinical insomnia (moderate severity)  22-28 = Clinical insomnia (severe)  Used via courtesy of  www.[facility].va.gov with permission from George Farley PhD., [facility]          Allergies:    No Known Allergies    Medications:    Current Outpatient Medications   Medication Sig Dispense Refill     ACCU-CHEK SMARTVIEW test strip USE TO TEST BLOOD SUGAR ONCE DAILY 100 strip 0     aspirin 81 MG tablet Take 1 tablet (81 mg) by mouth daily 30 tablet 11     atorvastatin (LIPITOR) 20 MG tablet TAKE 1 TABLET(20 MG) BY MOUTH DAILY 90 tablet 1     blood glucose monitoring (ACCU-CHEK FASTCLIX) lancets Test 1 times a day 100 each 1     glipiZIDE (GLUCOTROL XL) 10 MG 24 hr tablet TAKE 1 TABLET(10 MG) BY MOUTH DAILY 90 tablet 0     hydrochlorothiazide (HYDRODIURIL) 12.5 MG tablet Take 1 tablet (12.5 mg) by mouth daily 90 tablet 1     ibuprofen (ADVIL/MOTRIN) 200 MG capsule Take 200 mg by mouth every 4 hours as needed for fever 2 tabs       lisinopril (ZESTRIL) 40 MG tablet TAKE 1 TABLET(40 MG) BY MOUTH DAILY 90 tablet 0     metFORMIN (GLUCOPHAGE) 1000 MG tablet TAKE 1 TABLET BY MOUTH TWICE DAILY WITH MEALS 180 tablet 0     sildenafil (VIAGRA) 50 MG tablet Take 1 tablet (50 mg) by mouth daily as needed (as needed) 30 min to 4 hrs before sex. Do not use with nitroglycerin, terazosin or doxazosin. 6 tablet 3       Problem List:  Patient Active Problem List    Diagnosis Date Noted     Type 2 diabetes mellitus without complication, without long-term current use of insulin (H) 07/14/2017     Priority: Medium     Morbid obesity (H) 07/14/2017     Priority: Medium     Hypertension goal BP (blood pressure) < 140/80 06/11/2015     Priority: Medium     Hyperlipidemia LDL goal <100 06/11/2015     Priority: Medium     Advanced directives, counseling/discussion 12/10/2015     Priority: Low     Advance Care Planning 12/10/2015: ACP Review of Chart / Resources Provided:  Reviewed chart for advance care plan.  Pancho Costello has no plan or code status on file. Discussed available resources and provided with information.    Confirmed/documented legally designated decision maker(s). Added by Cristal Moya                Past Medical/Surgical History:  Past Medical History:   Diagnosis Date     Complication of anesthesia      Hypertension      Obese      Type 2 diabetes mellitus without complications (H)      Ventral hernia with bowel obstruction 9/13/2019     Past Surgical History:   Procedure Laterality Date     LAPAROSCOPIC HERNIORRHAPHY VENTRAL N/A 10/03/2019    Procedure: LAPAROSCOPIC INCARCERATED VENTRAL HERNIA REPAIR, LAPAROSCOPIC LYSIS OF ADHESIONS;  Surgeon: Severiano Lopez MD;  Location: [facility] OR     LAPAROSCOPIC LYSIS ADHESIONS N/A 10/03/2019    Procedure: Laparoscopic lysis adhesions;  Surgeon: Severiano Lopez MD;  Location: [facility] OR     LIGATN/STRIP LONG & SHORT SAPHEN  2000       Social History:  Social History     Socioeconomic History     Marital status:      Spouse name: Not on file     Number of children: Not on file     Years of education: Not on file     Highest education level: Not on file   Occupational History     Not on file   Tobacco Use     Smoking status: Never     Smokeless tobacco: Never   Substance and Sexual Activity     Alcohol use: Yes     Alcohol/week: 0.0 standard drinks     Drug use: No     Sexual activity: Yes     Partners: Female     Birth control/protection: None   Other Topics Concern     Parent/sibling w/ CABG, MI or angioplasty before 65F 55M? Not Asked   Social History Narrative     Not on file     Social Determinants of Health     Financial Resource Strain: Not on file   Food Insecurity: Not on file   Transportation Needs: Not on file   Physical Activity: Not on file   Stress: Not on file   Social Connections: Not on file   Intimate Partner Violence: Not on file   Housing Stability: Not on file       Family History:  Family History   Problem Relation Age of Onset     Heart Failure Mother      Breast Cancer Mother      Diabetes Mother        Review of Systems:  A complete review of systems  "reviewed by me is negative with the exeption of what has been mentioned in the history of present illness.  In the last TWO WEEKS have you experienced any of the following symptoms?  Fevers: No  Night Sweats: No  Weight Gain: No  Pain at Night: Yes  Double Vision: No  Changes in Vision: Yes  Difficulty Breathing through Nose: Yes  Sore Throat in Morning: Yes  Dry Mouth in the Morning: Yes  Shortness of Breath Lying Flat: No  Shortness of Breath With Activity: Yes  Awakening with Shortness of Breath: No  Increased Cough: Yes  Heart Racing at Night: No  Swelling in Feet or Legs: No  Diarrhea at Night: No  Heartburn at Night: Yes  Urinating More than Once at Night: Yes  Losing Control of Urine at Night: No  Joint Pains at Night: Yes  Headaches in Morning: No  Weakness in Arms or Legs: No  Depressed Mood: Yes  Anxiety: No       Physical Examination:  Vitals: Ht 1.854 m (6' 1\")   Wt 117.9 kg (260 lb)   BMI 34.30 kg/m    BMI= Body mass index is 34.3 kg/m .    SpO2 Readings from Last 4 Encounters:   09/22/22 97%   01/11/22 96%   06/22/21 96%   12/22/20 97%       GENERAL APPEARANCE: alert and no distress  EYES: Eyes grossly normal to inspection  HENT: mouth without ulcers or lesions  NECK: generous size  LUNGS: no shortness of breath , cough  NEURO: mentation intact, speech normal and cranial nerves 2-12 appear intact  PSYCH: affect normal/bright  Mallampati Class: 4            Data: All pertinent previous laboratory data reviewed     Recent Labs   Lab Test 09/22/22  0843 01/11/22  1000    134   POTASSIUM 5.0 4.4   CHLORIDE 104 102   CO2 27 25   ANIONGAP 5 7   * 204*   BUN 15 15   CR 0.93 0.85   YONAS 9.6 9.4       Recent Labs   Lab Test 09/12/19  1141   WBC 13.1*   RBC 4.68   HGB 15.8   HCT 45.7   MCV 98   MCH 33.8*   MCHC 34.6   RDW 12.7          Recent Labs   Lab Test 09/22/22  0843   PROTTOTAL 7.9   ALBUMIN 3.9   BILITOTAL 0.6   ALKPHOS 81   AST 37   ALT 68       TSH (mU/L)   Date Value   06/13/2018 " 1.35   07/13/2015 1.55         Wilfred Monroe MD 1/17/2023

## 2023-01-31 ENCOUNTER — TELEPHONE (OUTPATIENT)
Dept: FAMILY MEDICINE | Facility: CLINIC | Age: 68
End: 2023-01-31
Payer: COMMERCIAL

## 2023-01-31 NOTE — TELEPHONE ENCOUNTER
"Patient sent a MyChart referral request asking that his records be sent to his new provider. Routing to the team.    Pancho Costello \"Fly\"  Patient Referral Message Pool 4 days ago     Pancho Costello would like to request a referral.  Reason: Moving to [place]  Requested provider: Dr. Cook  Comment:  Hi,     I will be relocating to [place], [place].   I would like to transfer my medical care to Dr. Cook, Endocrinology, in [place].  They do request [place] fax my records to [phone].       Thank you,     Fly DESHPANDE RN  [facility] Triage Team    "

## 2023-02-09 ENCOUNTER — TELEPHONE (OUTPATIENT)
Dept: FAMILY MEDICINE | Facility: CLINIC | Age: 68
End: 2023-02-09

## 2023-02-09 NOTE — TELEPHONE ENCOUNTER
Patient Quality Outreach    Patient is due for the following:   Colon Cancer Screening    Next Steps:   Patient was assigned appropriate questionnaire to complete    Type of outreach:    Sent [employer] message.      Questions for provider review:    None     Hipolito Robison MA

## 2023-02-21 ASSESSMENT — SLEEP AND FATIGUE QUESTIONNAIRES
HOW LIKELY ARE YOU TO NOD OFF OR FALL ASLEEP WHILE SITTING AND TALKING TO SOMEONE: WOULD NEVER DOZE
HOW LIKELY ARE YOU TO NOD OFF OR FALL ASLEEP WHILE WATCHING TV: SLIGHT CHANCE OF DOZING
HOW LIKELY ARE YOU TO NOD OFF OR FALL ASLEEP WHEN YOU ARE A PASSENGER IN A CAR FOR AN HOUR WITHOUT A BREAK: WOULD NEVER DOZE
HOW LIKELY ARE YOU TO NOD OFF OR FALL ASLEEP WHILE LYING DOWN TO REST IN THE AFTERNOON WHEN CIRCUMSTANCES PERMIT: SLIGHT CHANCE OF DOZING
HOW LIKELY ARE YOU TO NOD OFF OR FALL ASLEEP IN A CAR, WHILE STOPPED FOR A FEW MINUTES IN TRAFFIC: WOULD NEVER DOZE
HOW LIKELY ARE YOU TO NOD OFF OR FALL ASLEEP WHILE SITTING QUIETLY AFTER LUNCH WITHOUT ALCOHOL: WOULD NEVER DOZE
HOW LIKELY ARE YOU TO NOD OFF OR FALL ASLEEP WHILE SITTING AND READING: SLIGHT CHANCE OF DOZING
HOW LIKELY ARE YOU TO NOD OFF OR FALL ASLEEP WHILE SITTING INACTIVE IN A PUBLIC PLACE: SLIGHT CHANCE OF DOZING

## 2023-02-22 ENCOUNTER — OFFICE VISIT (OUTPATIENT)
Dept: SLEEP MEDICINE | Facility: CLINIC | Age: 68
End: 2023-02-22
Payer: COMMERCIAL

## 2023-02-22 DIAGNOSIS — E66.01 MORBID OBESITY (H): Chronic | ICD-10-CM

## 2023-02-22 DIAGNOSIS — R06.83 SNORING: ICD-10-CM

## 2023-02-22 DIAGNOSIS — E11.9 TYPE 2 DIABETES MELLITUS WITHOUT COMPLICATION, WITHOUT LONG-TERM CURRENT USE OF INSULIN (H): Chronic | ICD-10-CM

## 2023-02-22 DIAGNOSIS — G47.33 OSA (OBSTRUCTIVE SLEEP APNEA): Chronic | ICD-10-CM

## 2023-02-22 DIAGNOSIS — I10 HYPERTENSION GOAL BP (BLOOD PRESSURE) < 140/80: Chronic | ICD-10-CM

## 2023-02-22 DIAGNOSIS — F51.04 CHRONIC INSOMNIA: ICD-10-CM

## 2023-02-22 PROCEDURE — G0399 HOME SLEEP TEST/TYPE 3 PORTA: HCPCS | Performed by: INTERNAL MEDICINE

## 2023-02-22 NOTE — NURSING NOTE
Pt is completing a home sleep test. Pt was instructed on how to put on the Noxturnal T3 device and associated equipment before going to bed and given the opportunity to practice putting it on before leaving the sleep center. Pt was reminded to bring the home sleep test kit back to the center tomorrow, at agreed upon time for download and reporting.   Neck circumference: 47 CM / 18 1/2 inches.

## 2023-02-23 ENCOUNTER — DOCUMENTATION ONLY (OUTPATIENT)
Dept: SLEEP MEDICINE | Facility: CLINIC | Age: 68
End: 2023-02-23
Payer: COMMERCIAL

## 2023-02-23 NOTE — PROGRESS NOTES
HST POST-STUDY QUESTIONNAIRE    1. What time did you go to bed?  10:00 pm  2. How long do you think it took to fall asleep?  20 mins  3. What time did you wake up to start the day?  6:45 am  4. Did you get up during the night at all?  yes  5. If you woke up, do you remember approximately what time(s)? Approx. 1:00 am, 3:00 am, 5:00 am  6. Did you have any difficulty with the equipment?  No  7. Did you us any type of treatment with this study?  None  8. Was the head of the bed elevated? Yes  9. Did you sleep in a recliner?  No  10. Did you stop using CPAP at least 3 days before this test?  NA  11. Any other information you'd like us to know?

## 2023-02-24 NOTE — PROGRESS NOTES
This HSAT was performed using a Noxturnal T3 device which recorded snore, sound, movement activity, body position, nasal pressure, oronasal thermal airflow, pulse, oximetry and both chest and abdominal respiratory effort. HSAT data was restricted to the time patient states they were in bed.     HSAT was scored using 1B 4% hypopnea rule.     HST AHI (Non-PAT): 62.2  Snoring was reported as loud.  Time with SpO2 below 89% was 245.8 minutes.   Overall signal quality was fair.     Pt will follow up with sleep provider to determine appropriate therapy.

## 2023-02-25 PROBLEM — G47.33 OSA (OBSTRUCTIVE SLEEP APNEA): Chronic | Status: ACTIVE | Noted: 2023-02-25

## 2023-02-25 NOTE — PROGRESS NOTES
Home Sleep Apnea Test  Shows very severe obstructive sleep apnea with very low Oxygen levels  Recommend treatment with CPAP  Let me know if patient wants me to order, does not have follow-up until 5.2023

## 2023-02-25 NOTE — PROCEDURES
"HOME SLEEP STUDY INTERPRETATION        Patient: Pancho Costello  MRN: [id number]  YOB: 1955  Study Date: 2/22/2023  PCP/Referring Provider: Chris Zuluaga;  Ordering Provider: Wilfred Monroe MD         Indications for Home Study: Pancho Costello is a 67 year old male with symptoms suggestive of obstructive sleep apnea.    Estimated body mass index is 34.3 kg/m  as calculated from the following:    Height as of 1/17/23: 1.854 m (6' 1\").    Weight as of 1/17/23: 117.9 kg (260 lb).  Total score - [place]: 4 (2/21/2023  5:09 PM)  StopBang Total Score: 7 (1/17/2023  9:00 AM)Total Score: 8 (2/21/2023  5:10 PM)        Data: A full night home sleep study was performed recording the standard physiologic parameters including body position, movement, sound, nasal pressure, thermal oral airflow, chest and abdominal movements with respiratory inductance plethysmography, and oxygen saturation by pulse oximetry. Pulse rate was estimated by oximetry recording. This study was considered adequate based on > 4 hours of quality oximetry and respiratory recording. As specified by the AASM Manual for the Scoring of Sleep and Associated events, version 2.3, Rule VIII.D 1B, 4% oxygen desaturation scoring for hypopneas is used as a standard of care on all home sleep apnea testing.        Analysis Time:  508 minutes        Respiration:   Sleep Associated Hypoxemia: sustained hypoxemia was present at start of the night, cannot rule out artifact. Baseline oxygen saturation was 94%.  Time with saturation less than or equal to 88% was 245 minutes. The lowest oxygen saturation was 67%.   Snoring: Snoring was present.  Respiratory events: The home study revealed a presence of 455 obstructive apneas and 3 mixed and central apneas. There were 60 hypopneas resulting in a combined apnea/hypopnea index [AHI] of 62 events per hour.  AHI was 54 per hour supine, n/a per hour prone, 74 per hour on left side, and 73 per hour on right side.   Pattern: " Excluding events noted above, respiratory rate and pattern was Normal.      Position: Percent of time spent: supine - 60%, prone - 0%, on left - 11%, on right - 27%.      Heart Rate: By pulse oximetry normal rate was noted.       Assessment:     Severe obstructive sleep apnea.    Sleep associated hypoxemia was present.    Recommendations:    Consider auto-CPAP at 7-18 cmH2O, polysomnography with full night PAP titration or surgical options.    Recommend follow-up oximetry or HSAT on PAP if titration study not done     Suggest optimizing sleep hygiene and avoiding sleep deprivation.    Weight management.        Diagnosis Code(s): Obstructive Sleep Apnea G47.33, Hypoxemia G47.36    Wilfred Monroe MD, February 25, 2023   Diplomate, American Board of Internal Medicine, Sleep Medicine

## 2023-03-03 ENCOUNTER — TELEPHONE (OUTPATIENT)
Dept: SLEEP MEDICINE | Facility: CLINIC | Age: 68
End: 2023-03-03
Payer: COMMERCIAL

## 2023-03-03 DIAGNOSIS — G47.33 OSA (OBSTRUCTIVE SLEEP APNEA): Primary | Chronic | ICD-10-CM

## 2023-03-03 NOTE — TELEPHONE ENCOUNTER
Patient called back. He would like to move forward in ordering the CPAP please.  Ailyn Flores, CMA

## 2023-03-03 NOTE — TELEPHONE ENCOUNTER
"Per provider notes this CMA attempted to contact patient by telephone.  There was no answer for LM on VM. Asked him to call me to let me know how he would like to proceed.  \"Wilfred Monroe MD at 2/22/2023 11:30 AM    Status: Addendum   Home Sleep Apnea Test  Shows very severe obstructive sleep apnea with very low Oxygen levels  Recommend treatment with CPAP  Let me know if patient wants me to order, does not have follow-up until 5.2023\"        Ailyn Flores CMA    "

## 2023-03-10 NOTE — TELEPHONE ENCOUNTER
Patient called back to make sure everything is set up. ROWENA assured him that Dr. Monroe put the order in for a new CPAP device and sent it to [facility]. Gave patient their number so he can check in with them.  Also reminded him that we will see him at his follow-up appointment with Dr. Monroe in May 2023. He denies further questions or concerns currently.  Ailyn Flores CMA

## 2023-03-12 DIAGNOSIS — I10 HYPERTENSION GOAL BP (BLOOD PRESSURE) < 140/80: ICD-10-CM

## 2023-03-12 DIAGNOSIS — E11.9 TYPE 2 DIABETES MELLITUS WITHOUT COMPLICATION, WITHOUT LONG-TERM CURRENT USE OF INSULIN (H): ICD-10-CM

## 2023-03-13 RX ORDER — GLIPIZIDE 10 MG/1
TABLET, FILM COATED, EXTENDED RELEASE ORAL
Qty: 90 TABLET | Refills: 0 | Status: SHIPPED | OUTPATIENT
Start: 2023-03-13 | End: 2023-06-12

## 2023-03-13 RX ORDER — LISINOPRIL 40 MG/1
TABLET ORAL
Qty: 90 TABLET | Refills: 1 | Status: SHIPPED | OUTPATIENT
Start: 2023-03-13

## 2023-03-22 ENCOUNTER — DOCUMENTATION ONLY (OUTPATIENT)
Dept: SLEEP MEDICINE | Facility: CLINIC | Age: 68
End: 2023-03-22
Payer: COMMERCIAL

## 2023-03-22 DIAGNOSIS — G47.33 OBSTRUCTIVE SLEEP APNEA (ADULT) (PEDIATRIC): Primary | ICD-10-CM

## 2023-03-22 NOTE — PROGRESS NOTES
Patient was offered choice of vendor and chose [facility].  Patient Pancho Costello was set up at [place] on March 22, 2023. Patient received a Resmed Airsense 11 Pressures were set at 7-18 cm H2O.   Patient s ramp is 5 cm H2O for Auto and FLEX/EPR is EPR, 2.  Patient received a Resmed Mask name: F30i  Full Face mask size Medium, heated tubing and heated humidifier.  Patient has the following compliance requirements: none  Patient has a follow up on TBD with Dr. Monroe.    Jessica Love

## 2023-03-26 ENCOUNTER — HEALTH MAINTENANCE LETTER (OUTPATIENT)
Age: 68
End: 2023-03-26

## 2023-03-27 ENCOUNTER — DOCUMENTATION ONLY (OUTPATIENT)
Dept: SLEEP MEDICINE | Facility: CLINIC | Age: 68
End: 2023-03-27
Payer: COMMERCIAL

## 2023-03-27 DIAGNOSIS — G47.33 OSA (OBSTRUCTIVE SLEEP APNEA): Primary | Chronic | ICD-10-CM

## 2023-03-27 NOTE — PROGRESS NOTES
3 day Sleep therapy management telephone visit    Diagnostic AHI:  62.2 HST    Confirmed with patient at time of call- Yes Patient is still interested in STM service       Subjective measures:  Patient reports CPAP has been going okay as he is getting used to it. He sometimes feels there is not enough pressure at first. Turned pts ramp off. Also turned EPR from ramp only to full time.       Order settings:  CPAP MIN CPAP MAX   7 cm H2O 18 cm H2O       Device settings:  CPAP MIN CPAP MAX EPR RESMED SOFT RESPONSE SETTING   7.0 cm  H20 18.0 cm  H20 TWO OFF       Compliance 20 %    Assessment: Nightly usage most nights under four hours       Action plan: Patient to have 14 day STM visit. Patient has a follow up visit scheduled:   no and not required by insurance    Replacement device: No  STM ordered by provider: Yes     Total time spent on accessing and  interpreting remote patient PAP therapy data  10 minutes    Total time spent counseling, coaching  and reviewing PAP therapy data with patient  5 minutes    [id number] no

## 2023-04-06 ENCOUNTER — DOCUMENTATION ONLY (OUTPATIENT)
Dept: SLEEP MEDICINE | Facility: CLINIC | Age: 68
End: 2023-04-06
Payer: COMMERCIAL

## 2023-04-06 DIAGNOSIS — G47.33 OSA (OBSTRUCTIVE SLEEP APNEA): Primary | Chronic | ICD-10-CM

## 2023-04-06 NOTE — PROGRESS NOTES
14  DAY STM VISIT    Diagnostic AHI:  62.2 HST    Subjective measures: Patient reports he has been having some struggled with his CPAP mask. He has the Airfit F30i and struggles to make it fit around his nose and has not felt like he has slept well with the CPAP. Gave patient some pointers regarding fit of that mask. He will contact [facility] as he would like some assistance with mask coaching and to consider a mask exchange. He is in [place] till the end of the month. I encouraged him to let [facility] know he needs help now     Assessment: Pt not meeting objective benchmarks for AHI, leak and compliance  Patient failing following subjective benchmarks: leak issues     Action plan: pt to have 30 day STM visit.      Device type: Auto-CPAP    PAP settings:  DEVICE TYPE CPAP MIN CPAP MAX 95TH % PRESSURE EPR MASK DISPENSED   Auto-CPAP    7.0 cm  H20 18.0 cm  H20 7.3 cm  H20  TWO Nasal Mask     Mask type:  Nasal Mask    Objective measures: 14 day rolling measures   COMPLIANCE LEAK AHI AVERAGE USE IN MINUTES   14 % 104.24 15.55 140   GOAL >70% GOAL < 24 LPM GOAL <5 GOAL >240          Total time spent on accessing and interpreting remote patient PAP therapy data  10 minutes    Total time spent counseling, coaching  and reviewing PAP therapy data with patient  8 minutes    [id number]  [id number]  no (3 day STM)

## 2023-04-24 ENCOUNTER — DOCUMENTATION ONLY (OUTPATIENT)
Dept: SLEEP MEDICINE | Facility: CLINIC | Age: 68
End: 2023-04-24
Payer: COMMERCIAL

## 2023-04-24 DIAGNOSIS — G47.33 OSA (OBSTRUCTIVE SLEEP APNEA): Primary | Chronic | ICD-10-CM

## 2023-04-24 NOTE — PROGRESS NOTES
30 DAY STM VISIT    Diagnostic AHI:  62.2 HST    PAP settings:  CPAP MIN CPAP MAX 95TH % PRESSURE EPR RESMED SOFT RESPONSE SETTING   7.0 cm  H20 18.0 cm  H20 11.1 cm  H20  TWO OFF     Device type: Auto-CPAP  Mask type:  Nasal Mask    Objective measures: 14 day rolling measures:    COMPLIANCE LEAK AHI AVERAGE USE IN MINUTES   7 % 30.26 4.02 137   GOAL >70% GOAL < 24 LPM GOAL <5 GOAL >240        Assessment: Pt not meeting objective benchmarks for leak and compliance     Message left for patient to return call   Action plan: waiting for patient to return call.  and pt to have 6 month [facility] visit  Patient has scheduled a follow up visit with Dr. Monroe on 5/22/2023.     Total time spent on accessing and interpreting remote patient PAP therapy data  10 minutes    Total time spent counseling, coaching  and reviewing PAP therapy data with patient  1 minutes     [id number] this call  [id number] no  at 3 or 14 day [facility]

## 2023-05-16 ASSESSMENT — SLEEP AND FATIGUE QUESTIONNAIRES
HOW LIKELY ARE YOU TO NOD OFF OR FALL ASLEEP WHILE SITTING AND TALKING TO SOMEONE: WOULD NEVER DOZE
HOW LIKELY ARE YOU TO NOD OFF OR FALL ASLEEP WHILE LYING DOWN TO REST IN THE AFTERNOON WHEN CIRCUMSTANCES PERMIT: SLIGHT CHANCE OF DOZING
HOW LIKELY ARE YOU TO NOD OFF OR FALL ASLEEP IN A CAR, WHILE STOPPED FOR A FEW MINUTES IN TRAFFIC: WOULD NEVER DOZE
HOW LIKELY ARE YOU TO NOD OFF OR FALL ASLEEP WHILE SITTING INACTIVE IN A PUBLIC PLACE: WOULD NEVER DOZE
HOW LIKELY ARE YOU TO NOD OFF OR FALL ASLEEP WHILE SITTING QUIETLY AFTER LUNCH WITHOUT ALCOHOL: SLIGHT CHANCE OF DOZING
HOW LIKELY ARE YOU TO NOD OFF OR FALL ASLEEP WHILE SITTING AND READING: SLIGHT CHANCE OF DOZING
HOW LIKELY ARE YOU TO NOD OFF OR FALL ASLEEP WHILE WATCHING TV: WOULD NEVER DOZE
HOW LIKELY ARE YOU TO NOD OFF OR FALL ASLEEP WHEN YOU ARE A PASSENGER IN A CAR FOR AN HOUR WITHOUT A BREAK: WOULD NEVER DOZE

## 2023-05-18 ENCOUNTER — TELEPHONE (OUTPATIENT)
Dept: FAMILY MEDICINE | Facility: CLINIC | Age: 68
End: 2023-05-18

## 2023-05-18 NOTE — TELEPHONE ENCOUNTER
Patient Quality Outreach    Patient is due for the following:   Physical Annual Wellness Visit    Next Steps:       Type of outreach:    Sent TempMine message.      Questions for provider review:    None           Hipolito Robison MA

## 2023-05-20 PROBLEM — R74.8 ELEVATED LIVER ENZYMES: Status: ACTIVE | Noted: 2023-04-03

## 2023-05-20 PROBLEM — N52.8 OTHER MALE ERECTILE DYSFUNCTION: Status: ACTIVE | Noted: 2023-04-03

## 2023-05-22 ENCOUNTER — VIRTUAL VISIT (OUTPATIENT)
Dept: SLEEP MEDICINE | Facility: CLINIC | Age: 68
End: 2023-05-22
Payer: COMMERCIAL

## 2023-05-22 VITALS — HEIGHT: 73 IN | WEIGHT: 255 LBS | BODY MASS INDEX: 33.8 KG/M2

## 2023-05-22 DIAGNOSIS — G47.33 OSA (OBSTRUCTIVE SLEEP APNEA): Primary | Chronic | ICD-10-CM

## 2023-05-22 PROCEDURE — 99214 OFFICE O/P EST MOD 30 MIN: CPT | Mod: VID | Performed by: INTERNAL MEDICINE

## 2023-05-22 ASSESSMENT — PAIN SCALES - GENERAL: PAINLEVEL: NO PAIN (0)

## 2023-05-22 NOTE — PROGRESS NOTES
Virtual Visit Details    Type of service:  Video Visit     Originating Location (pt. Location): Home    Distant Location (provider location):  Off-site  Platform used for Video Visit: Akua

## 2023-05-22 NOTE — NURSING NOTE
Is the patient currently in the state of [place]? YES    Visit mode:VIDEO    If the visit is dropped, the patient can be reconnected by: VIDEO VISIT: Send to e-mail at: [email or web address]    Will anyone else be joining the visit? NO      How would you like to obtain your AVS? MyChart    Are changes needed to the allergy or medication list? NO    Reason for visit: RECHECK (Sleep study follow up )    Has patient had flu shot for current/most recent flu season? If so, when? No

## 2023-05-22 NOTE — PROGRESS NOTES
Home Sleep Apnea Testing Results Visit:    Chief Complaint   Patient presents with     RECHECK     Sleep study follow up        Pancho Costello is a 67 year old male who had Home Sleep Apnea Testing.  He presented with socially disruptive snoring, witnessed apneas, snort arousals, sleep maintenance difficulties, nocturia, heartburn at night, obesity, large neck, crowded oropharynx. Comorbid hypertension, diabetes mellitus.   - Sleep onset, maintenance difficulties (SENAIT 18). Suspect psychophysiologic insomnia and complicated by sleep disordered breathing.       Home Sleep Apnea Testing - 2/22/23: (260 lbs)  AHI 62/hr.   AHI was 54 per hour supine, n/a per hour prone, 74 per hour on left side, and 73 per hour on right side.   Oxygen Castro of 67%.  Baseline 94%.  Sp02 =< 88% for 245 minutes  He slept on his back (60%), prone (0%), left (11%) and right (27%) sides.   Analysis time: 508 minutes.     Signal quality of Oxymeter 100% Good  Nasal Cannula 100% Good  RIP belts 100% Good.     He was prescribed auto CPAP 7-18 cmh20  Set up 3/2023    He will need follow-up oximetry on PAP    Do you use a CPAP Machine at home: Yes  Overall, on a scale of 0-10 how would you rate your CPAP (0 poor, 10 great): 5    What type of mask do you use: Full Face Mask  Is your mask comfortable: No  If not, why: I have a tendency to wake up around 1:30-2:00 am feeling as if the air flow isn't adequate and my mouth is very dry.     He has difficulty falling back to sleep and takes it off.     Is your mask leaking: No    Do you notice snoring with mask on: No  Do you notice gasping arousals with mask on: No  Are you having significant oral or nasal dryness: Yes  Is the pressure setting comfortable: Yes    What is your typical bedtime: 10:30 pm  How long does it take you to go to sleep on PAP therapy: 10-15 minutes  What time do you typically get out of bed for the day: 7:30 am  How many hours on average per night are you using PAP therapy:  3-4  How many hours are you sleeping per night: 6-7  Do you feel well rested in the morning: Yes     He is falling asleep well   Heartburn at night went away with CPAP  Nocturia is better  Feels more alive during day, less tired    He did read the book Say Good Night To Insomnia      ResMed   Auto-PAP 7.0 - 18.0 cmH2O 30 day usage data:    3% of days with > 4 hours of use. 4/30 days with no use.   Average use 2' 8 minutes per day.   95%ile Leak 25.63 L/min.   CPAP 95% pressure 12.2 cm. Median 10.2  AHI 3.43 events per hour.       EPWORTH SLEEPINESS SCALE         5/16/2023     9:31 AM    [place] Sleepiness Scale ( KARINE Brunson  [id number]<br>ESS - [place]/English - Final version - 21 Nov 07 - [facility] [place].)   Sitting and reading Slight chance of dozing   Watching TV Would never doze   Sitting, inactive in a public place (e.g. a theatre or a meeting) Would never doze   As a passenger in a car for an hour without a break Would never doze   Lying down to rest in the afternoon when circumstances permit Slight chance of dozing   Sitting and talking to someone Would never doze   Sitting quietly after a lunch without alcohol Slight chance of dozing   In a car, while stopped for a few minutes in traffic Would never doze   [place] Score (MC) 3   [place] Score (Sleep) 3       INSOMNIA SEVERITY INDEX (SENAIT)          5/16/2023     9:24 AM   Insomnia Severity Index (SENAIT)   Difficulty falling asleep 2   Difficulty staying asleep 3   Problems waking up too early 1   How SATISFIED/DISSATISFIED are you with your CURRENT sleep pattern? 2   How NOTICEABLE to others do you think your sleep problem is in terms of impairing the quality of your life? 3   How WORRIED/DISTRESSED are you about your current sleep problem? 3   To what extent do you consider your sleep problem to INTERFERE with your daily functioning (e.g. daytime fatigue, mood, ability to function at work/daily chores, concentration, memory, mood, etc.) CURRENTLY? 2   SENAIT  "Total Score 16       Guidelines for Scoring/Interpretation:  Total score categories:  0-7 = No clinically significant insomnia   8-14 = Subthreshold insomnia   15-21 = Clinical insomnia (moderate severity)  22-28 = Clinical insomnia (severe)1  Used via courtesy of www.Virtual [employer]be[employer].va.gov with permission from George Farley PhD., [facility]          Past medical/surgical history, family history, social history, medications and allergies were reviewed.      Ht 1.854 m (6' 1\")   Wt 115.7 kg (255 lb)   BMI 33.64 kg/m      Impression/Plan:    Severe Obstructive Sleep Apnea with hypoxemia  Not tolerating PAP well due to dryness, sleep maintenance difficulties   - He will need follow-up oximetry on PAP  - See DME about humidity adjustment   - Narrow pressures to 10-18 cmH20    Insomnia   Sleep onset difficulties resolved. Persistent sleep maintenance difficulties   We discussed stimulus control, sleep restriction and regular wake times.       I spent 15 minutes with patient including counseling, and 15 minutes with chart review, and documentation      "

## 2023-05-25 ENCOUNTER — TELEPHONE (OUTPATIENT)
Dept: FAMILY MEDICINE | Facility: CLINIC | Age: 68
End: 2023-05-25

## 2023-05-25 NOTE — TELEPHONE ENCOUNTER
Patient Quality Outreach    Patient is due for the following:   Colon Cancer Screening  Physical Annual Wellness Visit    Next Steps:   Patient was assigned appropriate questionnaire to complete    Type of outreach:    Sent flatev message.      Questions for provider review:    None           Hipolito Robison MA

## 2023-06-10 DIAGNOSIS — E78.5 HYPERLIPIDEMIA LDL GOAL <100: ICD-10-CM

## 2023-06-10 DIAGNOSIS — E11.9 TYPE 2 DIABETES MELLITUS WITHOUT COMPLICATION, WITHOUT LONG-TERM CURRENT USE OF INSULIN (H): ICD-10-CM

## 2023-06-10 NOTE — LETTER
June 20, 2023      Pancho Costello  [address]  [place] [address]        Hi Fly,    Our records indicate that it is time to schedule a visit with your primary care provider.  You are due to be seen for a follow-up of medications.  We have sent to the pharmacy a 3 month refill of your medication until you can be seen by your provider.  You may call [phone] to schedule or via KnexxLocal using the appointment tab.    If you are no longer a [facility] patient; please contact us and let us know that as well.  You will need to let the pharmacy know the name of your new provider so that they can send future refill requests to them.    Thank you,    [facility] [place]

## 2023-06-12 RX ORDER — GLIPIZIDE 10 MG/1
TABLET, FILM COATED, EXTENDED RELEASE ORAL
Qty: 90 TABLET | Refills: 0 | Status: SHIPPED | OUTPATIENT
Start: 2023-06-12

## 2023-06-12 RX ORDER — ATORVASTATIN CALCIUM 20 MG/1
TABLET, FILM COATED ORAL
Qty: 90 TABLET | Refills: 0 | Status: SHIPPED | OUTPATIENT
Start: 2023-06-12

## 2023-06-19 ENCOUNTER — DOCUMENTATION ONLY (OUTPATIENT)
Dept: SLEEP MEDICINE | Facility: CLINIC | Age: 68
End: 2023-06-19
Payer: COMMERCIAL

## 2023-06-19 DIAGNOSIS — G47.33 OSA (OBSTRUCTIVE SLEEP APNEA): Primary | Chronic | ICD-10-CM

## 2023-06-19 NOTE — Clinical Note
1 month [facility] follow up requested by you last month. Patient is doing much better since LOV. See not for details. He has a follow up scheduled for 9/26 with you. Thank you.

## 2023-06-19 NOTE — PROGRESS NOTES
"30 DAY STM VISIT    Diagnostic AHI:  62.2 HST    PAP settings:  CPAP MIN CPAP MAX 95TH % PRESSURE EPR RESMED SOFT RESPONSE SETTING   10.0 cm  H20 18.0 cm  H20 15.2 cm  H20  TWO OFF     Device type: Auto-CPAP  Mask type:  Nasal Mask    Objective measures: 14 day rolling measures:    COMPLIANCE LEAK AHI AVERAGE USE IN MINUTES   92 % 15.2 0.45 360   GOAL >70% GOAL < 24 LPM GOAL <5 GOAL >240          Subjective measures: Patient reports doing well with CPAP since his LOV. He has increased usage. He says he spoke with a friend who told him he had to \"just power through it\" and it worked and he feels good about CPAP now. He had questions about cleaning the tubing which were discussed.     Patient has the following upcoming sleep appts:  Future Sleep Appointments       Provider Department    9/26/2023 10:30 AM (Arrive by 10:15 AM) Wilfred Monroe MD [facility] [place]        Total time spent on accessing and interpreting remote patient PAP therapy data  10 minutes    Total time spent counseling, coaching  and reviewing PAP therapy data with patient  5 minutes     [id number] this call  [id number] no  at 3 or 14 day STM         "

## 2023-09-19 ASSESSMENT — SLEEP AND FATIGUE QUESTIONNAIRES
HOW LIKELY ARE YOU TO NOD OFF OR FALL ASLEEP WHILE LYING DOWN TO REST IN THE AFTERNOON WHEN CIRCUMSTANCES PERMIT: SLIGHT CHANCE OF DOZING
HOW LIKELY ARE YOU TO NOD OFF OR FALL ASLEEP WHEN YOU ARE A PASSENGER IN A CAR FOR AN HOUR WITHOUT A BREAK: WOULD NEVER DOZE
HOW LIKELY ARE YOU TO NOD OFF OR FALL ASLEEP WHILE SITTING AND TALKING TO SOMEONE: WOULD NEVER DOZE
HOW LIKELY ARE YOU TO NOD OFF OR FALL ASLEEP WHILE SITTING QUIETLY AFTER LUNCH WITHOUT ALCOHOL: SLIGHT CHANCE OF DOZING
HOW LIKELY ARE YOU TO NOD OFF OR FALL ASLEEP WHILE WATCHING TV: SLIGHT CHANCE OF DOZING
HOW LIKELY ARE YOU TO NOD OFF OR FALL ASLEEP WHILE SITTING INACTIVE IN A PUBLIC PLACE: WOULD NEVER DOZE
HOW LIKELY ARE YOU TO NOD OFF OR FALL ASLEEP IN A CAR, WHILE STOPPED FOR A FEW MINUTES IN TRAFFIC: WOULD NEVER DOZE
HOW LIKELY ARE YOU TO NOD OFF OR FALL ASLEEP WHILE SITTING AND READING: SLIGHT CHANCE OF DOZING

## 2023-09-26 ENCOUNTER — VIRTUAL VISIT (OUTPATIENT)
Dept: SLEEP MEDICINE | Facility: CLINIC | Age: 68
End: 2023-09-26
Payer: COMMERCIAL

## 2023-09-26 VITALS — WEIGHT: 255 LBS | BODY MASS INDEX: 33.8 KG/M2 | HEIGHT: 73 IN

## 2023-09-26 DIAGNOSIS — G47.33 OSA (OBSTRUCTIVE SLEEP APNEA): Primary | Chronic | ICD-10-CM

## 2023-09-26 DIAGNOSIS — F51.04 CHRONIC INSOMNIA: ICD-10-CM

## 2023-09-26 DIAGNOSIS — E66.01 MORBID OBESITY (H): Chronic | ICD-10-CM

## 2023-09-26 PROCEDURE — 99214 OFFICE O/P EST MOD 30 MIN: CPT | Mod: VID | Performed by: INTERNAL MEDICINE

## 2023-09-26 ASSESSMENT — PAIN SCALES - GENERAL: PAINLEVEL: NO PAIN (1)

## 2023-09-26 NOTE — PATIENT INSTRUCTIONS
Your BMI is There is no height or weight on file to calculate BMI.    What is BMI?  Body mass index (BMI) is one way to tell whether you are at a healthy weight, overweight, or obese. It measures your weight in relation to your height.  A BMI of 18.5 to 24.9 is in the healthy range. A person with a BMI of 25 to 29.9 is considered overweight, and someone with a BMI of 30 or greater is considered obese.  Another way to find out if you are at risk for health problems caused by overweight and obesity is to measure your waist. If you are a woman and your waist is more than 35 inches, or if you are a man and your waist is more than 40 inches, your risk of disease may be higher.  More than two-thirds of American adults are considered overweight or obese. Being overweight or obese increases the risk for further weight gain.  Excess weight may lead to heart disease and diabetes. Creating and following plans for healthy eating and physical activity may help you improve your health.    Methods for maintaining or losing weight.  Weight control is part of healthy lifestyle and includes exercise, emotional health, and healthy eating habits.  Careful eating habits lifelong is the mainstay of weight control.  Though there are significant health benefits from weight loss, long-term weight loss with diet alone may be very difficult to achieve- studies show long-term success with dietary management in less than 10% of people. Attaining a healthy weight may be especially difficult to achieve in those with severe obesity. In some cases, medications, devices and surgical management might be considered.    What can you do?  If you are overweight or obese and are interested in methods for weight loss, you should discuss this with your provider. In addition, we recommend that you review healthy life styles and methods for weight loss available through the National Institutes of Health patient information sites:    http://win.niddk.nih.gov/publications/index.htm

## 2023-09-26 NOTE — NURSING NOTE
Is the patient currently in the state of [place]? YES    Visit mode:VIDEO    If the visit is dropped, the patient can be reconnected by: VIDEO VISIT: Send to e-mail at: [email or web address]    Will anyone else be joining the visit? NO  (If patient encounters technical issues they should call [phone]    How would you like to obtain your AVS? MyChart    Are changes needed to the allergy or medication list? Pt stated no changes to allergies and Pt stated no med changes    Reason for visit: RECHECK  Has patient had flu shot for current/most recent flu season? If so, when? No-plans on getting in a few weeks    Alyssia THOMPSONF

## 2023-09-26 NOTE — PROGRESS NOTES
Sleep Apnea - Follow-up Visit:    Impression/Plan:     Severe Sleep apnea.   Tolerating PAP well. Daytime symptoms are improved.   - Continue current treatments.   - WatchPAT on PAP    Insomnia (SENAIT 18 ? 16 ? 9)  Improved     Obesity  - See patient instructions          Pancho Costello will follow up in about 2 year(s).     I spent >5 minutes with patient including counseling, and >10 minutes with chart review, and documentation         History of Present Illness:  Chief Complaint   Patient presents with    RECHECK       Pancho Costello presents for follow-up of their severe sleep apnea, managed with CPAP.     [facility]     He presented with socially disruptive snoring, witnessed apneas, snort arousals, sleep maintenance difficulties, nocturia, heartburn at night, obesity, large neck, crowded oropharynx. Comorbid hypertension, diabetes mellitus.   - Sleep onset, maintenance difficulties (SENAIT 18). Suspect psychophysiologic insomnia complicated by sleep disordered breathing.      Home Sleep Apnea Testing - 2/22/23: (260 lbs)  AHI 62/hr.   AHI 54 per hour supine, n/a per hour prone, 74 per hour on left side, and 73 per hour on right side.   Oxygen Castro of 67%.  Baseline 94%.  Sp02 =< 88% for 245 minutes  He slept on his back (60%), prone (0%), left (11%) and right (27%) sides.   Analysis time: 508 minutes.      Signal quality of Oxymeter 100% Good  Nasal Cannula 100% Good  RIP belts 100% Good.      He was prescribed auto CPAP 7-18 cmh20. Set up 3/2023. nInitial follow-up was ot tolerating PAP well due to dryness, sleep maintenance difficulties. Narrowed pressures to 10-18 cmH20    At visit 5/2023 Not tolerating PAP well due to dryness, sleep maintenance difficulties.  Sleep onset difficulties resolved., but had persistent sleep maintenance difficulties (SENAIT 16)    - He will need follow-up oximetry on PAP    Do you use a CPAP Machine at home: Yes  Overall, on a scale of 0-10 how would you rate your CPAP (0 poor,  10 great): 8    What type of mask do you use: Full Face Mask    Is your mask leaking: No    Do you notice snoring with mask on: No  Do you notice gasping arousals with mask on: No  Are you having significant oral or nasal dryness: Yes  Is the pressure setting comfortable: Yes    What is your typical bedtime: 10:30-11:00  How long does it take you to go to sleep on PAP therapy: 10-15 mins  What time do you typically get out of bed for the day: 7:30  How many hours are you sleeping per night: 6-7  Do you feel well rested in the morning: Yes    He did not make any particular behavioral changes   He did stay up later   He awakens 1 a night  He has trouble falling back to sleep <1 times a week   CPAP is on all night      [employer]   Auto-PAP 10.0 - 18.0 cmH2O 30 day usage data:    83% of days with > 4 hours of use. 0/30 days with no use.   Average use 6' 4 minutes per day.   95%ile Leak 20.52 L/min.   CPAP 95% pressure 14.7 cm. Median 11  AHI 0.41 events per hour.     He has less nocturia and heartburn at night       EPWORTH SLEEPINESS SCALE         9/19/2023     9:48 AM    Sanford Sleepiness Scale ( KARINE Brunson  [id number]<br>ESS - [place]/English - Final version - 21 Nov 07 - [facility] [place].)   Sitting and reading Slight chance of dozing   Watching TV Slight chance of dozing   Sitting, inactive in a public place (e.g. a theatre or a meeting) Would never doze   As a passenger in a car for an hour without a break Would never doze   Lying down to rest in the afternoon when circumstances permit Slight chance of dozing   Sitting and talking to someone Would never doze   Sitting quietly after a lunch without alcohol Slight chance of dozing   In a car, while stopped for a few minutes in traffic Would never doze   Sanford Score (MC) 4   Sanford Score (Sleep) 4       INSOMNIA SEVERITY INDEX (SENAIT)          9/19/2023     9:46 AM   Insomnia Severity Index (SENAIT)   Difficulty falling asleep 1   Difficulty staying asleep 1   Problems  waking up too early 2   How SATISFIED/DISSATISFIED are you with your CURRENT sleep pattern? 1   How NOTICEABLE to others do you think your sleep problem is in terms of impairing the quality of your life? 2   How WORRIED/DISTRESSED are you about your current sleep problem? 1   To what extent do you consider your sleep problem to INTERFERE with your daily functioning (e.g. daytime fatigue, mood, ability to function at work/daily chores, concentration, memory, mood, etc.) CURRENTLY? 1   SENAIT Total Score 9       Guidelines for Scoring/Interpretation:  Total score categories:  0-7 = No clinically significant insomnia   8-14 = Subthreshold insomnia   15-21 = Clinical insomnia (moderate severity)  22-28 = Clinical insomnia (severe)  Used via courtesy of www.[employer].va.gov with permission from George Farley PhD., [facility]        Past medical/surgical history, family history, social history, medications and allergies were reviewed.        Problem List:  Patient Active Problem List    Diagnosis Date Noted    Elevated liver enzymes 04/03/2023     Priority: Medium    JULIETH (obstructive sleep apnea)- severe (AHI 62) 02/25/2023     Priority: Medium     Home Study 2/22/23 (260 lb) -  apnea/hypopnea index [AHI] of 62 events per hour.  AHI was 54 per hour supine, n/a per hour prone, 74 per hour on left side, and 73 per hour on right side.  Sustained hypoxemia was present at start of the night, cannot rule out artifact. Baseline oxygen saturation was 94%.  Time with saturation less than or equal to 88% was 245 minutes. The lowest oxygen saturation was 67%.  Percent of time spent: supine - 60%, prone - 0%, on left - 11%, on right - 27%.      Type 2 diabetes mellitus without complication, without long-term current use of insulin (H) 07/14/2017     Priority: Medium    Hypertension goal BP (blood pressure) < 140/80 06/11/2015     Priority: Medium    Hyperlipidemia LDL goal <100 06/11/2015     Priority: Medium    Other male  "erectile dysfunction 04/03/2023     Priority: Low    Chronic insomnia 01/17/2023     Priority: Low    Morbid obesity (H) 07/14/2017     Priority: Low    Advanced directives, counseling/discussion 12/10/2015     Priority: Low     Advance Care Planning 12/10/2015: ACP Review of Chart / Resources Provided:  Reviewed chart for advance care plan.  Pancho Costello has no plan or code status on file. Discussed available resources and provided with information.   Confirmed/documented legally designated decision maker(s). Added by Cristal Moya          Ht 1.854 m (6' 1\")   Wt 115.7 kg (255 lb)   BMI 33.64 kg/m      "

## 2023-11-04 ENCOUNTER — HEALTH MAINTENANCE LETTER (OUTPATIENT)
Age: 68
End: 2023-11-04

## 2023-12-15 ENCOUNTER — VIRTUAL VISIT (OUTPATIENT)
Dept: SLEEP MEDICINE | Facility: CLINIC | Age: 68
End: 2023-12-15
Payer: COMMERCIAL

## 2023-12-15 DIAGNOSIS — G47.33 OSA (OBSTRUCTIVE SLEEP APNEA): Chronic | ICD-10-CM

## 2023-12-15 NOTE — PROGRESS NOTES
Device has been registered and shipped via [employer] on 12/15/23. Patient was notified that package was mailed out.    China Jain CMA, HST Specialist  [place] [facility]

## 2023-12-20 PROCEDURE — 95800 SLP STDY UNATTENDED: CPT | Performed by: INTERNAL MEDICINE

## 2023-12-27 NOTE — PROGRESS NOTES
Watch Pat has been scored using rule 1B, 4%.  Patient to follow up with provider to determine appropriate therapy.    PAT AHI: 5    Ordering Provider: Wilfred Monroe MD

## 2024-01-03 NOTE — PROCEDURES
"WatchPAT - HOME SLEEP STUDY INTERPRETATION    Patient: Pancho Costello  MRN: [id number]  YOB: 1955  Study Date: 12/20/2023  Referring Provider: No primary care provider on file.;   Ordering Provider: Wilfred Monroe MD    Chain of custody patient verification was not enabled.      Indications for Home Study: Pancho Costello is a 68 year old male with a history of obstructive sleep apnea. Study done to assess for residua hypoxemia on PAP.    Estimated body mass index is 33.64 kg/m  as calculated from the following:    Height as of 9/26/23: 1.854 m (6' 1\").    Weight as of 9/26/23: 115.7 kg (255 lb).    Data: A full night home sleep study was performed recording the standard physiologic parameters including peripheral arterial tonometry (PAT), sound/snoring, body position,  movement, sound, and oxygen saturation by pulse oximetry. Pulse rate was estimated by oximetry recording. Sleep staging (wake, REM, light, and deep sleep) was derived from PAT signal.  This study was considered adequate based on > 4 hours of quality oximetry and respiratory recording. As specified by the AASM Manual for the Scoring of Sleep and Associated events, version 2.3, Rule VIII.D 1B, 4% oxygen desaturation scoring for hypopneas is used as a standard of care on all home sleep apnea testing.    Total Recording Time: 8 hrs, 39 min  Total Sleep Time: 6 hrs, 54 min  % of Sleep Time REM: 13%    Respiratory:  Respiratory events: The PAT respiratory disturbance index [pRDI] was 9 events per hour.  The PAT apnea/hypopnea index [pAHI] was 5 events per hour.  GENNA was 3 events per hour.  During REM sleep the pAHI was 1.  Sleep Associated Hypoxemia: sustained hypoxemia was not present. Mean oxygen saturation was 94%.  Minimum was 89%.  Time with saturation less than 88% was 0 minutes.    Heart Rate: By pulse oximetry normal rate was noted.     Position: Percent of time spent: supine - 54%, prone - 0%, on right - 18%, on left - " 28%.    Assessment:   Sleep associated hypoxemia was not present.      Diagnosis Code(s): Obstructive Sleep Apnea G47.33, Hypoxemia G47.36    Wilfred Monroe MD, January 3, 2024   Diplomate, American Board of Internal Medicine, Sleep Medicine

## 2024-06-01 ENCOUNTER — HEALTH MAINTENANCE LETTER (OUTPATIENT)
Age: 69
End: 2024-06-01

## 2024-06-10 DIAGNOSIS — E11.9 TYPE 2 DIABETES MELLITUS WITHOUT COMPLICATION, WITHOUT LONG-TERM CURRENT USE OF INSULIN (H): ICD-10-CM

## 2024-06-12 NOTE — TELEPHONE ENCOUNTER
Spoke to pt, he has moved to [place].  He did not request refill. Pharmacy advised to stop sending refills to Dr Zuluaga.Jean VILLEDA CMA

## 2024-09-05 DIAGNOSIS — E11.9 TYPE 2 DIABETES MELLITUS WITHOUT COMPLICATION, WITHOUT LONG-TERM CURRENT USE OF INSULIN (H): ICD-10-CM

## 2024-09-05 NOTE — TELEPHONE ENCOUNTER
Not seen this patient since 2022.  Please first make a follow-up appointment and let me know I will fill the medication till that time.

## 2024-12-17 ENCOUNTER — TRANSFERRED RECORDS (OUTPATIENT)
Dept: HEALTH INFORMATION MANAGEMENT | Facility: CLINIC | Age: 69
End: 2024-12-17
Payer: COMMERCIAL

## 2024-12-22 ENCOUNTER — HEALTH MAINTENANCE LETTER (OUTPATIENT)
Age: 69
End: 2024-12-22

## 2025-06-14 ENCOUNTER — HEALTH MAINTENANCE LETTER (OUTPATIENT)
Age: 70
End: 2025-06-14

## 2025-07-05 ENCOUNTER — HEALTH MAINTENANCE LETTER (OUTPATIENT)
Age: 70
End: 2025-07-05

## (undated) DEVICE — SU NUROLON 0 CT-1 CR 8X18" C521D

## (undated) DEVICE — SUCTION IRR STRYKERFLOW II W/TIP 250-070-520

## (undated) DEVICE — SOL NACL 0.9% INJ 1000ML BAG 2B1324X

## (undated) DEVICE — BLADE CLIPPER 4406

## (undated) DEVICE — LINEN TOWEL PACK X5 5464

## (undated) DEVICE — DEVICE FIXATION SECURESTRAP TACKER 5MM ABSORB STRAP25

## (undated) DEVICE — ENDO TROCAR FIRST ENTRY KII FIOS Z-THRD 12X100MM CTF73

## (undated) DEVICE — BNDG ABDOMINAL BINDER 9X30-45" 79-89070

## (undated) DEVICE — SOL WATER IRRIG 1000ML BOTTLE 2F7114

## (undated) DEVICE — ENDO SCOPE WARMER LF TM500

## (undated) DEVICE — PACK LAP CHOLE SLC15LCFSD

## (undated) DEVICE — ESU GROUND PAD UNIVERSAL W/O CORD

## (undated) DEVICE — DEVICE SUTURE GRASPER TROCAR CLOSURE 14GA PMITCSG

## (undated) DEVICE — SU VICRYL 0 CT-2 27" J334H

## (undated) DEVICE — DRAPE BREAST/CHEST 29420

## (undated) DEVICE — ENDO TROCAR FIRST ENTRY KII FIOS Z-THRD 05X100MM CTF03

## (undated) DEVICE — ESU HOLDER LAP INST DISP PURPLE LONG 330MM H-PRO-330

## (undated) DEVICE — ENDO TROCAR SLEEVE KII Z-THREADED 05X100MM CTS02

## (undated) DEVICE — PREP CHLORAPREP 26ML TINTED ORANGE  260815

## (undated) DEVICE — KIT SURGICAL TURNOVER FVSD-01D

## (undated) DEVICE — ESU ENDO SCISSORS 5MM CVD 5DCS

## (undated) DEVICE — SYSTEM CLEARIFY VISUALIZATION 21-345

## (undated) DEVICE — ESU LIGASURE MARYLAND LAPAROSCOPIC SLR/DVDR 5MMX37CM LF1937

## (undated) DEVICE — SU MONOCRYL 4-0 PS-2 18" UND Y496G

## (undated) DEVICE — GLOVE PROTEXIS BLUE W/NEU-THERA 7.5  2D73EB75

## (undated) DEVICE — GLOVE PROTEXIS MICRO 7.5  2D73PM75

## (undated) RX ORDER — PROPOFOL 10 MG/ML
INJECTION, EMULSION INTRAVENOUS
Status: DISPENSED
Start: 2019-10-03

## (undated) RX ORDER — NEOSTIGMINE METHYLSULFATE 1 MG/ML
VIAL (ML) INJECTION
Status: DISPENSED
Start: 2019-10-03

## (undated) RX ORDER — GLYCOPYRROLATE 0.2 MG/ML
INJECTION, SOLUTION INTRAMUSCULAR; INTRAVENOUS
Status: DISPENSED
Start: 2019-10-03

## (undated) RX ORDER — OXYCODONE HYDROCHLORIDE 5 MG/1
TABLET ORAL
Status: DISPENSED
Start: 2019-10-03

## (undated) RX ORDER — HYDROMORPHONE HYDROCHLORIDE 1 MG/ML
INJECTION, SOLUTION INTRAMUSCULAR; INTRAVENOUS; SUBCUTANEOUS
Status: DISPENSED
Start: 2019-10-03

## (undated) RX ORDER — CEFAZOLIN SODIUM 1 G/3ML
INJECTION, POWDER, FOR SOLUTION INTRAMUSCULAR; INTRAVENOUS
Status: DISPENSED
Start: 2019-10-03

## (undated) RX ORDER — ONDANSETRON 2 MG/ML
INJECTION INTRAMUSCULAR; INTRAVENOUS
Status: DISPENSED
Start: 2019-10-03

## (undated) RX ORDER — FENTANYL CITRATE 50 UG/ML
INJECTION, SOLUTION INTRAMUSCULAR; INTRAVENOUS
Status: DISPENSED
Start: 2019-10-03

## (undated) RX ORDER — EPINEPHRINE 1 MG/ML
INJECTION, SOLUTION INTRAMUSCULAR; SUBCUTANEOUS
Status: DISPENSED
Start: 2019-10-03

## (undated) RX ORDER — LIDOCAINE HYDROCHLORIDE 20 MG/ML
INJECTION, SOLUTION EPIDURAL; INFILTRATION; INTRACAUDAL; PERINEURAL
Status: DISPENSED
Start: 2019-10-03

## (undated) RX ORDER — DEXAMETHASONE SODIUM PHOSPHATE 4 MG/ML
INJECTION, SOLUTION INTRA-ARTICULAR; INTRALESIONAL; INTRAMUSCULAR; INTRAVENOUS; SOFT TISSUE
Status: DISPENSED
Start: 2019-10-03

## (undated) RX ORDER — CEFAZOLIN SODIUM 2 G/100ML
INJECTION, SOLUTION INTRAVENOUS
Status: DISPENSED
Start: 2019-10-03

## (undated) RX ORDER — FENTANYL CITRATE 0.05 MG/ML
INJECTION, SOLUTION INTRAMUSCULAR; INTRAVENOUS
Status: DISPENSED
Start: 2019-10-03

## (undated) RX ORDER — LIDOCAINE HYDROCHLORIDE 10 MG/ML
INJECTION, SOLUTION INFILTRATION; PERINEURAL
Status: DISPENSED
Start: 2019-10-03

## (undated) RX ORDER — KETOROLAC TROMETHAMINE 30 MG/ML
INJECTION, SOLUTION INTRAMUSCULAR; INTRAVENOUS
Status: DISPENSED
Start: 2019-10-03

## (undated) RX ORDER — BUPIVACAINE HYDROCHLORIDE 2.5 MG/ML
INJECTION, SOLUTION EPIDURAL; INFILTRATION; INTRACAUDAL
Status: DISPENSED
Start: 2019-10-03